# Patient Record
Sex: MALE | HISPANIC OR LATINO | Employment: UNEMPLOYED | URBAN - METROPOLITAN AREA
[De-identification: names, ages, dates, MRNs, and addresses within clinical notes are randomized per-mention and may not be internally consistent; named-entity substitution may affect disease eponyms.]

---

## 2020-02-28 ENCOUNTER — APPOINTMENT (OUTPATIENT)
Dept: RADIOLOGY | Facility: MEDICAL CENTER | Age: 25
End: 2020-02-28
Attending: EMERGENCY MEDICINE
Payer: COMMERCIAL

## 2020-03-29 ENCOUNTER — APPOINTMENT (OUTPATIENT)
Dept: RADIOLOGY | Facility: MEDICAL CENTER | Age: 25
DRG: 193 | End: 2020-03-29
Attending: EMERGENCY MEDICINE
Payer: COMMERCIAL

## 2020-03-29 ENCOUNTER — HOSPITAL ENCOUNTER (INPATIENT)
Facility: MEDICAL CENTER | Age: 25
LOS: 5 days | DRG: 193 | End: 2020-04-03
Attending: EMERGENCY MEDICINE | Admitting: HOSPITALIST
Payer: COMMERCIAL

## 2020-03-29 DIAGNOSIS — J12.9 VIRAL PNEUMONIA: ICD-10-CM

## 2020-03-29 DIAGNOSIS — J06.9 VIRAL URI WITH COUGH: ICD-10-CM

## 2020-03-29 PROBLEM — R09.02 HYPOXIA: Status: ACTIVE | Noted: 2020-03-29

## 2020-03-29 PROBLEM — R74.01 TRANSAMINITIS: Status: ACTIVE | Noted: 2020-03-29

## 2020-03-29 PROBLEM — B34.9 VIRAL SYNDROME: Status: ACTIVE | Noted: 2020-03-29

## 2020-03-29 PROBLEM — D72.819 LEUKOPENIA: Status: ACTIVE | Noted: 2020-03-29

## 2020-03-29 LAB
ALBUMIN SERPL BCP-MCNC: 3.5 G/DL (ref 3.2–4.9)
ALBUMIN/GLOB SERPL: 1 G/DL
ALP SERPL-CCNC: 66 U/L (ref 30–99)
ALT SERPL-CCNC: 153 U/L (ref 2–50)
ANION GAP SERPL CALC-SCNC: 10 MMOL/L (ref 7–16)
AST SERPL-CCNC: 143 U/L (ref 12–45)
BASOPHILS # BLD AUTO: 0.3 % (ref 0–1.8)
BASOPHILS # BLD: 0.01 K/UL (ref 0–0.12)
BILIRUB SERPL-MCNC: 0.9 MG/DL (ref 0.1–1.5)
BUN SERPL-MCNC: 9 MG/DL (ref 8–22)
CALCIUM SERPL-MCNC: 8.3 MG/DL (ref 8.5–10.5)
CHLORIDE SERPL-SCNC: 103 MMOL/L (ref 96–112)
CO2 SERPL-SCNC: 23 MMOL/L (ref 20–33)
CREAT SERPL-MCNC: 1.12 MG/DL (ref 0.5–1.4)
CRP SERPL HS-MCNC: 1 MG/DL (ref 0–0.75)
D DIMER PPP IA.FEU-MCNC: 0.58 UG/ML (FEU) (ref 0–0.5)
EKG IMPRESSION: NORMAL
EOSINOPHIL # BLD AUTO: 0.01 K/UL (ref 0–0.51)
EOSINOPHIL NFR BLD: 0.3 % (ref 0–6.9)
ERYTHROCYTE [DISTWIDTH] IN BLOOD BY AUTOMATED COUNT: 36.5 FL (ref 35.9–50)
FERRITIN SERPL-MCNC: 1209 NG/ML (ref 22–322)
FLUAV RNA SPEC QL NAA+PROBE: NEGATIVE
FLUBV RNA SPEC QL NAA+PROBE: NEGATIVE
GLOBULIN SER CALC-MCNC: 3.6 G/DL (ref 1.9–3.5)
GLUCOSE SERPL-MCNC: 123 MG/DL (ref 65–99)
HAV IGM SERPL QL IA: NORMAL
HBV CORE IGM SER QL: NORMAL
HBV SURFACE AG SER QL: NORMAL
HCT VFR BLD AUTO: 48.1 % (ref 42–52)
HCV AB SER QL: NORMAL
HGB BLD-MCNC: 16.4 G/DL (ref 14–18)
IMM GRANULOCYTES # BLD AUTO: 0.02 K/UL (ref 0–0.11)
IMM GRANULOCYTES NFR BLD AUTO: 0.5 % (ref 0–0.9)
LACTATE BLD-SCNC: 1.6 MMOL/L (ref 0.5–2)
LYMPHOCYTES # BLD AUTO: 0.8 K/UL (ref 1–4.8)
LYMPHOCYTES NFR BLD: 21.6 % (ref 22–41)
MCH RBC QN AUTO: 28.4 PG (ref 27–33)
MCHC RBC AUTO-ENTMCNC: 34.1 G/DL (ref 33.7–35.3)
MCV RBC AUTO: 83.4 FL (ref 81.4–97.8)
MONOCYTES # BLD AUTO: 0.26 K/UL (ref 0–0.85)
MONOCYTES NFR BLD AUTO: 7 % (ref 0–13.4)
NEUTROPHILS # BLD AUTO: 2.61 K/UL (ref 1.82–7.42)
NEUTROPHILS NFR BLD: 70.3 % (ref 44–72)
NRBC # BLD AUTO: 0 K/UL
NRBC BLD-RTO: 0 /100 WBC
PLATELET # BLD AUTO: 174 K/UL (ref 164–446)
PMV BLD AUTO: 9.4 FL (ref 9–12.9)
POTASSIUM SERPL-SCNC: 3.8 MMOL/L (ref 3.6–5.5)
PROCALCITONIN SERPL-MCNC: <0.05 NG/ML
PROT SERPL-MCNC: 7.1 G/DL (ref 6–8.2)
RBC # BLD AUTO: 5.77 M/UL (ref 4.7–6.1)
SODIUM SERPL-SCNC: 136 MMOL/L (ref 135–145)
WBC # BLD AUTO: 3.7 K/UL (ref 4.8–10.8)

## 2020-03-29 PROCEDURE — 86140 C-REACTIVE PROTEIN: CPT

## 2020-03-29 PROCEDURE — 700102 HCHG RX REV CODE 250 W/ 637 OVERRIDE(OP): Performed by: EMERGENCY MEDICINE

## 2020-03-29 PROCEDURE — 99285 EMERGENCY DEPT VISIT HI MDM: CPT

## 2020-03-29 PROCEDURE — 80074 ACUTE HEPATITIS PANEL: CPT

## 2020-03-29 PROCEDURE — 71045 X-RAY EXAM CHEST 1 VIEW: CPT

## 2020-03-29 PROCEDURE — 82728 ASSAY OF FERRITIN: CPT

## 2020-03-29 PROCEDURE — 87502 INFLUENZA DNA AMP PROBE: CPT

## 2020-03-29 PROCEDURE — A9270 NON-COVERED ITEM OR SERVICE: HCPCS | Performed by: EMERGENCY MEDICINE

## 2020-03-29 PROCEDURE — 93005 ELECTROCARDIOGRAM TRACING: CPT | Performed by: EMERGENCY MEDICINE

## 2020-03-29 PROCEDURE — 87040 BLOOD CULTURE FOR BACTERIA: CPT

## 2020-03-29 PROCEDURE — 770021 HCHG ROOM/CARE - ISO PRIVATE

## 2020-03-29 PROCEDURE — 36415 COLL VENOUS BLD VENIPUNCTURE: CPT

## 2020-03-29 PROCEDURE — 85025 COMPLETE CBC W/AUTO DIFF WBC: CPT

## 2020-03-29 PROCEDURE — 306637 HCHG MISC ORTHO ITEM RC 0274

## 2020-03-29 PROCEDURE — 85379 FIBRIN DEGRADATION QUANT: CPT

## 2020-03-29 PROCEDURE — 84145 PROCALCITONIN (PCT): CPT

## 2020-03-29 PROCEDURE — 700105 HCHG RX REV CODE 258: Performed by: EMERGENCY MEDICINE

## 2020-03-29 PROCEDURE — 83605 ASSAY OF LACTIC ACID: CPT

## 2020-03-29 PROCEDURE — 99223 1ST HOSP IP/OBS HIGH 75: CPT | Performed by: HOSPITALIST

## 2020-03-29 PROCEDURE — 80053 COMPREHEN METABOLIC PANEL: CPT

## 2020-03-29 RX ORDER — GUAIFENESIN/DEXTROMETHORPHAN 100-10MG/5
10 SYRUP ORAL EVERY 6 HOURS PRN
Status: DISCONTINUED | OUTPATIENT
Start: 2020-03-29 | End: 2020-04-03 | Stop reason: HOSPADM

## 2020-03-29 RX ORDER — ACETAMINOPHEN 325 MG/1
650 TABLET ORAL EVERY 6 HOURS PRN
Status: DISCONTINUED | OUTPATIENT
Start: 2020-03-29 | End: 2020-04-03 | Stop reason: HOSPADM

## 2020-03-29 RX ORDER — SODIUM CHLORIDE 9 MG/ML
2000 INJECTION, SOLUTION INTRAVENOUS ONCE
Status: COMPLETED | OUTPATIENT
Start: 2020-03-29 | End: 2020-03-29

## 2020-03-29 RX ORDER — MORPHINE SULFATE 4 MG/ML
4 INJECTION, SOLUTION INTRAMUSCULAR; INTRAVENOUS ONCE
Status: ACTIVE | OUTPATIENT
Start: 2020-03-29 | End: 2020-03-30

## 2020-03-29 RX ORDER — ACETAMINOPHEN 500 MG
1000 TABLET ORAL ONCE
Status: COMPLETED | OUTPATIENT
Start: 2020-03-29 | End: 2020-03-29

## 2020-03-29 RX ADMIN — ACETAMINOPHEN 1000 MG: 500 TABLET ORAL at 20:39

## 2020-03-29 RX ADMIN — SODIUM CHLORIDE 2000 ML: 9 INJECTION, SOLUTION INTRAVENOUS at 20:39

## 2020-03-29 ASSESSMENT — LIFESTYLE VARIABLES
EVER_SMOKED: NEVER
TOTAL SCORE: 0
TOTAL SCORE: 0
DO YOU DRINK ALCOHOL: NO
EVER FELT BAD OR GUILTY ABOUT YOUR DRINKING: NO
CONSUMPTION TOTAL: NEGATIVE
EVER_SMOKED: NEVER
AVERAGE NUMBER OF DAYS PER WEEK YOU HAVE A DRINK CONTAINING ALCOHOL: 1
EVER HAD A DRINK FIRST THING IN THE MORNING TO STEADY YOUR NERVES TO GET RID OF A HANGOVER: NO
HAVE PEOPLE ANNOYED YOU BY CRITICIZING YOUR DRINKING: NO
HOW MANY TIMES IN THE PAST YEAR HAVE YOU HAD 5 OR MORE DRINKS IN A DAY: 0
DOES PATIENT WANT TO STOP DRINKING: NO
ON A TYPICAL DAY WHEN YOU DRINK ALCOHOL HOW MANY DRINKS DO YOU HAVE: 1
HAVE YOU EVER FELT YOU SHOULD CUT DOWN ON YOUR DRINKING: NO
ALCOHOL_USE: YES
TOTAL SCORE: 0

## 2020-03-29 ASSESSMENT — COPD QUESTIONNAIRES
DURING THE PAST 4 WEEKS HOW MUCH DID YOU FEEL SHORT OF BREATH: NONE/LITTLE OF THE TIME
HAVE YOU SMOKED AT LEAST 100 CIGARETTES IN YOUR ENTIRE LIFE: NO/DON'T KNOW
DO YOU EVER COUGH UP ANY MUCUS OR PHLEGM?: YES, A FEW DAYS A WEEK OR MONTH
COPD SCREENING SCORE: 1

## 2020-03-29 ASSESSMENT — PATIENT HEALTH QUESTIONNAIRE - PHQ9
2. FEELING DOWN, DEPRESSED, IRRITABLE, OR HOPELESS: NOT AT ALL
SUM OF ALL RESPONSES TO PHQ9 QUESTIONS 1 AND 2: 0
1. LITTLE INTEREST OR PLEASURE IN DOING THINGS: NOT AT ALL

## 2020-03-29 ASSESSMENT — ENCOUNTER SYMPTOMS
MYALGIAS: 0
TINGLING: 0
FOCAL WEAKNESS: 0
DIZZINESS: 0
ABDOMINAL PAIN: 0
VOMITING: 0
DEPRESSION: 0
FEVER: 1
HEADACHES: 0
NAUSEA: 0
SORE THROAT: 0
DIARRHEA: 0
PALPITATIONS: 0
PHOTOPHOBIA: 0
CHILLS: 1
SPUTUM PRODUCTION: 1
WHEEZING: 0
SHORTNESS OF BREATH: 0
COUGH: 1

## 2020-03-30 ENCOUNTER — APPOINTMENT (OUTPATIENT)
Dept: RADIOLOGY | Facility: MEDICAL CENTER | Age: 25
DRG: 193 | End: 2020-03-30
Attending: NURSE PRACTITIONER
Payer: COMMERCIAL

## 2020-03-30 LAB
ALBUMIN SERPL BCP-MCNC: 3.3 G/DL (ref 3.2–4.9)
ALBUMIN/GLOB SERPL: 1 G/DL
ALP SERPL-CCNC: 58 U/L (ref 30–99)
ALT SERPL-CCNC: 128 U/L (ref 2–50)
ANION GAP SERPL CALC-SCNC: 10 MMOL/L (ref 7–16)
ANION GAP SERPL CALC-SCNC: 11 MMOL/L (ref 7–16)
AST SERPL-CCNC: 114 U/L (ref 12–45)
BASOPHILS # BLD AUTO: 0.2 % (ref 0–1.8)
BASOPHILS # BLD: 0.01 K/UL (ref 0–0.12)
BILIRUB CONJ SERPL-MCNC: 0.3 MG/DL (ref 0.1–0.5)
BILIRUB INDIRECT SERPL-MCNC: 0.6 MG/DL (ref 0–1)
BILIRUB SERPL-MCNC: 0.9 MG/DL (ref 0.1–1.5)
BUN SERPL-MCNC: 8 MG/DL (ref 8–22)
BUN SERPL-MCNC: 9 MG/DL (ref 8–22)
CALCIUM SERPL-MCNC: 7.5 MG/DL (ref 8.5–10.5)
CALCIUM SERPL-MCNC: 7.7 MG/DL (ref 8.5–10.5)
CHLORIDE SERPL-SCNC: 103 MMOL/L (ref 96–112)
CHLORIDE SERPL-SCNC: 107 MMOL/L (ref 96–112)
CO2 SERPL-SCNC: 22 MMOL/L (ref 20–33)
CO2 SERPL-SCNC: 22 MMOL/L (ref 20–33)
CREAT SERPL-MCNC: 0.79 MG/DL (ref 0.5–1.4)
CREAT SERPL-MCNC: 0.86 MG/DL (ref 0.5–1.4)
D DIMER PPP IA.FEU-MCNC: 0.8 UG/ML (FEU) (ref 0–0.5)
EOSINOPHIL # BLD AUTO: 0.01 K/UL (ref 0–0.51)
EOSINOPHIL NFR BLD: 0.2 % (ref 0–6.9)
ERYTHROCYTE [DISTWIDTH] IN BLOOD BY AUTOMATED COUNT: 37.2 FL (ref 35.9–50)
ERYTHROCYTE [DISTWIDTH] IN BLOOD BY AUTOMATED COUNT: 38.1 FL (ref 35.9–50)
FERRITIN SERPL-MCNC: 1101 NG/ML (ref 22–322)
GLOBULIN SER CALC-MCNC: 3.3 G/DL (ref 1.9–3.5)
GLUCOSE SERPL-MCNC: 90 MG/DL (ref 65–99)
GLUCOSE SERPL-MCNC: 95 MG/DL (ref 65–99)
HCT VFR BLD AUTO: 46.1 % (ref 42–52)
HCT VFR BLD AUTO: 46.3 % (ref 42–52)
HGB BLD-MCNC: 15.4 G/DL (ref 14–18)
HGB BLD-MCNC: 15.4 G/DL (ref 14–18)
IMM GRANULOCYTES # BLD AUTO: 0.03 K/UL (ref 0–0.11)
IMM GRANULOCYTES NFR BLD AUTO: 0.7 % (ref 0–0.9)
LDH SERPL L TO P-CCNC: 355 U/L (ref 107–266)
LYMPHOCYTES # BLD AUTO: 1.17 K/UL (ref 1–4.8)
LYMPHOCYTES NFR BLD: 26.7 % (ref 22–41)
MCH RBC QN AUTO: 28.3 PG (ref 27–33)
MCH RBC QN AUTO: 28.4 PG (ref 27–33)
MCHC RBC AUTO-ENTMCNC: 33.3 G/DL (ref 33.7–35.3)
MCHC RBC AUTO-ENTMCNC: 33.4 G/DL (ref 33.7–35.3)
MCV RBC AUTO: 84.7 FL (ref 81.4–97.8)
MCV RBC AUTO: 85.3 FL (ref 81.4–97.8)
MONOCYTES # BLD AUTO: 0.24 K/UL (ref 0–0.85)
MONOCYTES NFR BLD AUTO: 5.5 % (ref 0–13.4)
NEUTROPHILS # BLD AUTO: 2.93 K/UL (ref 1.82–7.42)
NEUTROPHILS NFR BLD: 66.7 % (ref 44–72)
NRBC # BLD AUTO: 0 K/UL
NRBC BLD-RTO: 0 /100 WBC
PLATELET # BLD AUTO: 162 K/UL (ref 164–446)
PLATELET # BLD AUTO: 179 K/UL (ref 164–446)
PMV BLD AUTO: 9.3 FL (ref 9–12.9)
PMV BLD AUTO: 9.7 FL (ref 9–12.9)
POTASSIUM SERPL-SCNC: 4 MMOL/L (ref 3.6–5.5)
POTASSIUM SERPL-SCNC: 4.3 MMOL/L (ref 3.6–5.5)
PROT SERPL-MCNC: 6.6 G/DL (ref 6–8.2)
RBC # BLD AUTO: 5.43 M/UL (ref 4.7–6.1)
RBC # BLD AUTO: 5.44 M/UL (ref 4.7–6.1)
SODIUM SERPL-SCNC: 136 MMOL/L (ref 135–145)
SODIUM SERPL-SCNC: 139 MMOL/L (ref 135–145)
WBC # BLD AUTO: 4 K/UL (ref 4.8–10.8)
WBC # BLD AUTO: 4.4 K/UL (ref 4.8–10.8)

## 2020-03-30 PROCEDURE — 85025 COMPLETE CBC W/AUTO DIFF WBC: CPT

## 2020-03-30 PROCEDURE — 80048 BASIC METABOLIC PNL TOTAL CA: CPT

## 2020-03-30 PROCEDURE — 700102 HCHG RX REV CODE 250 W/ 637 OVERRIDE(OP): Performed by: HOSPITALIST

## 2020-03-30 PROCEDURE — 700117 HCHG RX CONTRAST REV CODE 255: Performed by: NURSE PRACTITIONER

## 2020-03-30 PROCEDURE — 306637 HCHG MISC ORTHO ITEM RC 0274

## 2020-03-30 PROCEDURE — 85379 FIBRIN DEGRADATION QUANT: CPT

## 2020-03-30 PROCEDURE — 82728 ASSAY OF FERRITIN: CPT

## 2020-03-30 PROCEDURE — 85027 COMPLETE CBC AUTOMATED: CPT

## 2020-03-30 PROCEDURE — 770021 HCHG ROOM/CARE - ISO PRIVATE

## 2020-03-30 PROCEDURE — 80053 COMPREHEN METABOLIC PANEL: CPT

## 2020-03-30 PROCEDURE — A9270 NON-COVERED ITEM OR SERVICE: HCPCS | Performed by: HOSPITALIST

## 2020-03-30 PROCEDURE — 99232 SBSQ HOSP IP/OBS MODERATE 35: CPT | Performed by: HOSPITALIST

## 2020-03-30 PROCEDURE — 82248 BILIRUBIN DIRECT: CPT

## 2020-03-30 PROCEDURE — 83615 LACTATE (LD) (LDH) ENZYME: CPT

## 2020-03-30 PROCEDURE — 71275 CT ANGIOGRAPHY CHEST: CPT

## 2020-03-30 RX ADMIN — IOHEXOL 60 ML: 350 INJECTION, SOLUTION INTRAVENOUS at 13:43

## 2020-03-30 RX ADMIN — ACETAMINOPHEN 650 MG: 325 TABLET, FILM COATED ORAL at 13:21

## 2020-03-30 RX ADMIN — ACETAMINOPHEN 650 MG: 325 TABLET, FILM COATED ORAL at 20:22

## 2020-03-30 ASSESSMENT — ENCOUNTER SYMPTOMS
BLURRED VISION: 0
DIZZINESS: 0
HEMOPTYSIS: 0
COUGH: 1
ORTHOPNEA: 0
DOUBLE VISION: 0
BRUISES/BLEEDS EASILY: 0
SHORTNESS OF BREATH: 1
SENSORY CHANGE: 0
STRIDOR: 0
SEIZURES: 0
FEVER: 1
VOMITING: 0
ABDOMINAL PAIN: 0
NAUSEA: 1
BLOOD IN STOOL: 0
DIAPHORESIS: 0
WEIGHT LOSS: 0
PHOTOPHOBIA: 0
DIARRHEA: 1
SORE THROAT: 0
WHEEZING: 0
HEARTBURN: 0
PND: 0
CHILLS: 0
MYALGIAS: 0
PALPITATIONS: 0
FOCAL WEAKNESS: 0
HEADACHES: 0

## 2020-03-30 ASSESSMENT — COPD QUESTIONNAIRES
COPD SCREENING SCORE: 4
DURING THE PAST 4 WEEKS HOW MUCH DID YOU FEEL SHORT OF BREATH: SOME OF THE TIME
HAVE YOU SMOKED AT LEAST 100 CIGARETTES IN YOUR ENTIRE LIFE: YES
DO YOU EVER COUGH UP ANY MUCUS OR PHLEGM?: YES, A FEW DAYS A WEEK OR MONTH

## 2020-03-30 NOTE — DISCHARGE PLANNING
Medical Social Work    Pt's parents came in to the ER Lobby requesting to see pt.  MSW spoke with bedside RN who states that she was trying to help pt contact his family last night with no luck.  RN states that pt is currently sleeping.   MSW met with pt's mom and dad, Rebel Nash ([phone]) and advised them of the above.  Pt's family was educated about our current visitor policy and were advised that pt is sleeping and he will be advised of their visit.  Pt's family was also given CDU nurses station number to call later this morning to check on pt.  Pt's parents left hospital at this time.

## 2020-03-30 NOTE — PROGRESS NOTES
New patient from the ED. Patient is stable and Ax4 and on 2LNC. Patient is ambulatory. Bed in lowest position. Bedside table within reach. Call bell at bedside.

## 2020-03-30 NOTE — ED NOTES
PT resting with IVF running as ordered.  PT with VSS but on RA pt drops to 88%.  PT on 2l NC and speaking in full sentences.

## 2020-03-30 NOTE — PROGRESS NOTES
[facility] Daily Progress Note    Date of Service  3/30/2020    Chief Complaint  24 y.o. male admitted 3/29/2020 with fever, cough, and shortness of breath    Hospital Course    Patient is a 24-year-old male who presented with 2 to 3 days of fever, cough, shortness of breath and diarrhea.  Influenza swabs in the ER were negative.  Chest x-ray shows poor inspiratory effort/obesity with bibasilar atelectasis versus infiltrate      Interval Problem Update  T-max per 24 is 102.7  P 77, RR 17, 94% on RA, although patient has desatted to the low 80s with ambulation and at rest  WBCs low  Lymphocytes normal  Transaminitis is slightly improved  Elevated LDH  D-dimer is increasing slightly to 0.8  Ferritin elevated  CTA chest shows multifocal PNA    Consultants/Specialty  None    Code Status  Full    Disposition  TBD    Review of Systems  Review of Systems   Constitutional: Positive for fever and malaise/fatigue. Negative for chills, diaphoresis and weight loss.   HENT: Negative for ear pain, sore throat and tinnitus.    Eyes: Negative for blurred vision, double vision and photophobia.   Respiratory: Positive for cough and shortness of breath. Negative for hemoptysis, wheezing and stridor.    Cardiovascular: Negative for chest pain, palpitations, orthopnea and PND.   Gastrointestinal: Positive for diarrhea and nausea. Negative for abdominal pain, blood in stool, heartburn, melena and vomiting.   Genitourinary: Negative for dysuria, hematuria and urgency.   Musculoskeletal: Negative for joint pain and myalgias.   Neurological: Negative for dizziness, sensory change, focal weakness, seizures and headaches.   Endo/Heme/Allergies: Does not bruise/bleed easily.        Physical Exam  Temp:  [37 °C (98.6 °F)-39.3 °C (102.7 °F)] 37.2 °C (98.9 °F)  Pulse:  [] 77  Resp:  [16-20] 17  BP: ()/(55-89) 102/63  SpO2:  [94 %-96 %] 94 %    Physical Exam  Vitals signs and nursing note reviewed.   Constitutional:        Appearance: He is obese. He is ill-appearing.      Interventions: Nasal cannula in place.   HENT:      Head: Normocephalic and atraumatic.      Nose: Nose normal.   Eyes:      Conjunctiva/sclera: Conjunctivae normal.      Pupils: Pupils are equal, round, and reactive to light.   Neck:      Musculoskeletal: Neck supple.   Cardiovascular:      Rate and Rhythm: Normal rate and regular rhythm.      Heart sounds: No murmur. No friction rub.   Pulmonary:      Effort: No respiratory distress.   Abdominal:      General: Bowel sounds are normal. There is no distension.      Palpations: Abdomen is soft.   Skin:     General: Skin is warm and dry.   Neurological:      Mental Status: He is alert.         Fluids  No intake or output data in the 24 hours ending 03/30/20 1114    Laboratory  Recent Labs     03/29/20 1955 03/30/20 0223 03/30/20  0938   WBC 3.7* 4.0* 4.4*   RBC 5.77 5.44 5.43   HEMOGLOBIN 16.4 15.4 15.4   HEMATOCRIT 48.1 46.1 46.3   MCV 83.4 84.7 85.3   MCH 28.4 28.3 28.4   MCHC 34.1 33.4* 33.3*   RDW 36.5 37.2 38.1   PLATELETCT 174 162* 179   MPV 9.4 9.3 9.7     Recent Labs     03/29/20 1955 03/30/20 0223   SODIUM 136 139   POTASSIUM 3.8 4.3   CHLORIDE 103 107   CO2 23 22   GLUCOSE 123* 90   BUN 9 9   CREATININE 1.12 0.86   CALCIUM 8.3* 7.5*                   Imaging  DX-CHEST-PORTABLE (1 VIEW)   Final Result      Poor inspiration with hypoventilatory change with bibasilar atelectasis versus infiltrate.      CT-CTA CHEST PULMONARY ARTERY W/ RECONS    (Results Pending)        Assessment/Plan  * Viral syndrome- (present on admission)  Assessment & Plan  COVID-19 pending  Surrogate markers suggestive of infection  D-dimer is elevated and climbing  CT CTA pulmonary artery shows multifocal PNA  Supplemental O2  Tylenol and morphine for pain --follow transaminitis and avoid acetaminophen if needed  Avoid IV fluids  Avoid NSAIDs    Hypoxia- (present on admission)  Assessment & Plan  Treatment as noted  above.    Transaminitis- (present on admission)  Assessment & Plan  Improving  Daily CMP    Leukopenia- (present on admission)  Assessment & Plan  Likely secondary to underlying viral infection, continue to monitor.       VTE prophylaxis: Enoxaparin

## 2020-03-30 NOTE — PROGRESS NOTES
Assessment completed.  Pt A&Ox4, afebrile.  Respirations even, unlabored on room air, diminished lung sound to RLL and LLL.   Dry cough noted, pt denies SOB. Pt denies any pain at this time.   POC discussed; communication board updated.    Bed in locked, lowest position.  Call light and belongings within reach.  Needs met, will continue to monitor.

## 2020-03-30 NOTE — DISCHARGE PLANNING
Care Transition Team Assessment    Chart reviewed. 24 yr old male with father as support. No PCP. Anticipate no needs @ present time. CM will follow as needed.    Information Source  Information Given By: Other (Comments)(Chart reviewed.)    Readmission Evaluation  Is this a readmission?: No  Interdisciplinary Discharge Planning  Primary Care Physician: None  Patient or legal guardian wants to designate a caregiver (see row info): No  Support Systems: Parent  Do You Take your Prescribed Medications Regularly: No  Reasons Why Not Taking Medications : (No PCP)  Able to Return to Previous ADL's: Yes  Mobility Issues: No  Prior Services: None  Patient Expects to be Discharged to:: Home  Assistance Needed: No  Durable Medical Equipment: Not Applicable    Discharge Preparedness  What are your discharge supports?: Parent  Prior Functional Level: Ambulatory    Functional Assesment  Prior Functional Level: Ambulatory    Finances  Prescription Coverage: Yes    Discharge Risks or Barriers  Discharge risks or barriers?: No PCP  Patient risk factors: No PCP    Anticipated Discharge Information  Anticipated discharge disposition: Home  Discharge Address: [address] [place] [address]  Discharge Contact Phone Number: [phone]

## 2020-03-30 NOTE — CARE PLAN
Problem: Monitor Oxygen Saturation  Goal: Levels of oxygenation will improve  Outcome: PROGRESSING AS EXPECTED

## 2020-03-30 NOTE — H&P
Hospital Medicine History & Physical Note    Date of Service  3/29/2020    Primary Care Physician  No primary care provider on file.    Consultants  None    Code Status  Full    Chief Complaint  Chief Complaint   Patient presents with   • Shortness of Breath     cough x 2 days   • N/V     started today   • Diarrhea     x 1 today       History of Presenting Illness  24 y.o. male who presented on 3/29/2020 with shortness of breath and cough.  This is a 24-year-old gentleman with no reported medical issues who comes in with approximately 1 week of cough productive of white sputum associated with subjective fevers, chills, and a gradually decreasing sensation of taste and smell.  Patient denies any headaches, myalgias, but in the last day he did develop episodes of vomiting with nausea and diarrhea.  He has had no other sick contacts that he is aware of and no one else is ill in his home.  He has had no recent travel.    Review of Systems  Review of Systems   Constitutional: Positive for chills and fever.   HENT: Negative for congestion and sore throat.    Eyes: Negative for photophobia.   Respiratory: Positive for cough and sputum production. Negative for shortness of breath and wheezing.    Cardiovascular: Negative for chest pain and palpitations.   Gastrointestinal: Negative for abdominal pain, diarrhea, nausea and vomiting.   Genitourinary: Negative for dysuria.   Musculoskeletal: Negative for myalgias.   Skin: Negative.    Neurological: Negative for dizziness, tingling, focal weakness and headaches.        Change in taste and smell   Psychiatric/Behavioral: Negative for depression and suicidal ideas.       Past Medical History  Patient denies    Surgical History  Patient denies    Family History  Paternal grandmother with diabetes    Social History  Quit smoking 1 week ago, occasional alcohol, no illicit drugs    Allergies  No Known Allergies    Medications  No current facility-administered medications on file  prior to encounter.      No current outpatient medications on file prior to encounter.       Physical Exam  Hemodynamics  Temp (24hrs), Av.3 °C (102.7 °F), Min:39.3 °C (102.7 °F), Max:39.3 °C (102.7 °F)   Temperature: (!) 39.3 °C (102.7 °F)  Pulse  Av.5  Min: 97  Max: 112    Blood Pressure: 104/59      Respiratory      Respiration: 20, Pulse Oximetry: 94 %             Physical Exam   Constitutional: He is oriented to person, place, and time. No distress.   Obese   HENT:   Head: Normocephalic and atraumatic.   Right Ear: External ear normal.   Left Ear: External ear normal.   Eyes: EOM are normal. Right eye exhibits no discharge. Left eye exhibits no discharge.   Neck: Neck supple. No JVD present.   Cardiovascular: Normal rate, regular rhythm and normal heart sounds.   Pulmonary/Chest: Effort normal and breath sounds normal. No respiratory distress. He exhibits no tenderness.   Abdominal: Soft. Bowel sounds are normal. He exhibits no distension. There is no abdominal tenderness.   Musculoskeletal:         General: No edema.   Neurological: He is alert and oriented to person, place, and time. No cranial nerve deficit.   Skin: Skin is dry. He is not diaphoretic. No erythema.   Psychiatric: He has a normal mood and affect. His behavior is normal.   Nursing note and vitals reviewed.    Capillary refill less than 3 seconds, distal pulses intact    Laboratory:  Recent Labs     20   WBC 3.7*   RBC 5.77   HEMOGLOBIN 16.4   HEMATOCRIT 48.1   MCV 83.4   MCH 28.4   MCHC 34.1   RDW 36.5   PLATELETCT 174   MPV 9.4     Recent Labs     20   SODIUM 136   POTASSIUM 3.8   CHLORIDE 103   CO2 23   GLUCOSE 123*   BUN 9   CREATININE 1.12   CALCIUM 8.3*     Recent Labs     20   ALTSGPT 153*   ASTSGOT 143*   ALKPHOSPHAT 66   TBILIRUBIN 0.9   GLUCOSE 123*                 No results found for: TROPONINI    Imaging  Dx-chest-portable (1 View)    Result Date: 3/29/2020  3/29/2020 7:48 PM  HISTORY/REASON FOR EXAM:  possible sepsis.. Cough. Shortness of breath. TECHNIQUE/EXAM DESCRIPTION AND NUMBER OF VIEWS: Single portable view of the chest. COMPARISON: None FINDINGS: Heart size is within normal limits.  Film was taken after poor inspiration and there is hypoventilatory change. There is bibasilar atelectasis versus infiltrate. No pleural abnormalities are noted.     Poor inspiration with hypoventilatory change with bibasilar atelectasis versus infiltrate.        Assessment/Plan:  Anticipate that patient will need greater than 2 midnights for management of the discussed medical issues.    * Viral syndrome  Assessment & Plan  Patient with suspected viral upper respiratory infection with hypoxia and fever requiring admission.  Because of this, he will be ruled out for coronavirus.  He will be admitted to the hospital and provided with supportive care and symptomatic management for his cough, placed on respiratory therapy protocol and supplemental O2 which we will wean as tolerated.  Tylenol for fevers, check procalcitonin and ferritin level.  Airborne droplet precautions remain in place until ruled out.    Hypoxia  Assessment & Plan  Treatment as noted above.    Transaminitis  Assessment & Plan  Likely secondary to underlying viral infection, continue to monitor, check hepatitis panel.  If any worsening, will need to stop hepatotoxic medications including Tylenol.    Leukopenia  Assessment & Plan  Likely secondary to underlying viral infection, continue to monitor.      Prophylaxis: Sequential compression devices for DVT prophylaxis, no PPI indicated, bowel protocol as needed

## 2020-03-30 NOTE — PROGRESS NOTES
2 RN Skin Check    2 RN skin check complete [place]  Devices in place: Nasal Cannula.  Skin assessed under devices: YES.  Confirmed pressure ulcers found on: No pressure ulcers found.  New potential pressure ulcers noted on: Not at risk for pressure ulcers.    Patient skin was assessed from head to toe and all skin was intact. Patient has healed abscess on scalp.

## 2020-03-30 NOTE — ED PROVIDER NOTES
ED Provider Note    Scribed for Prabhjot Mcnulty M.D. by Anamika Jackson. 3/29/2020, 7:28 PM.    Primary care provider: PCP, Pt. States none.  Means of arrival: EMS  History obtained from: Patient  History limited by: None    CHIEF COMPLAINT  Chief Complaint   Patient presents with   • Shortness of Breath     cough x 2 days   • N/V     started today   • Diarrhea     x 1 today       HPI  Patient is a 24 y.o. male who presents to the Emergency Department via EMS for shortness of breath and nonproductive cough that began 2 days ago. Patient notes that his shortness of breath and cough are constant and worsening since this morning.  He has taken ibuprofen with mild relief of symptoms. He reports associated non bloody diarrhea x1 week, nausea, non bloody emesis x1 episode this morning, and mild chest pain with his cough. He denies any associated fever, abdominal pain, unilateral leg swelling, exogenous hormone use, history of DVT/PE, or hemoptysis. He denies receiving the influenza vaccine. He also denies any recent travel, sick contacts, or known COVID-19 exposure. He admits to occasional alcohol use, but denies smoking cigarettes, or recreational drug use.     REVIEW OF SYSTEMS  Pertinent positives include shortness of breath, nonproductive cough, diarrhea, nausea, vomiting, chest pain. Pertinent negatives include no fever, abdominal pain, or unilateral leg swelling. As above, all other systems reviewed and are negative.   See HPI for further details.     PAST MEDICAL HISTORY   Denies    SURGICAL HISTORY  patient denies any surgical history    SOCIAL HISTORY  Social History     Tobacco Use   • Smoking status: None   Substance Use Topics   • Alcohol use: Occasional   • Drug use: None        FAMILY HISTORY  None    CURRENT MEDICATIONS  Home Medications     Reviewed by Wyatt Johnson R.N. (Registered Nurse) on 03/29/20 at 1924  Med List Status: Not Addressed   Medication Last Dose Status        Patient [place] Taking any  "Medications                       ALLERGIES  No Known Allergies    PHYSICAL EXAM  VITAL SIGNS: /89   Pulse (!) 112   Temp 37.7 °C (99.8 °F) (Oral)   Resp 20   Ht 1.753 m (5' 9\")   Wt (!) 135 kg (297 lb 9.9 oz)   SpO2 94%   BMI 43.95 kg/m²   Vitals reviewed.    Constitutional: Alert in no apparent distress.  HENT: No signs of trauma, Bilateral external ears normal, Nose normal.  Dry mucous membranes.  Eyes: Pupils are equal and reactive, Conjunctiva normal, Non-icteric.   Neck: Normal range of motion, No tenderness, Supple, No stridor.   Lymphatic: No lymphadenopathy noted.   Cardiovascular: Tachycardic, Regular rhythm, no murmurs.   Thorax & Lungs: Decreased breath sounds globally, Normal breath sounds, No respiratory distress, No wheezing, No chest tenderness.   Abdomen: Bowel sounds normal, Soft, No tenderness, No peritoneal signs, No masses, No pulsatile masses.   Skin: Warm, Dry, No erythema, No rash.   Back: Normal alignment. No CVAT.   Extremities: Intact distal pulses, No edema, No tenderness, No cyanosis  Musculoskeletal: Good range of motion in all major joints. No major deformities noted.   Neurologic: Alert, Normal motor function, Normal sensory function, No focal deficits noted.   Psychiatric: Affect normal, Judgment normal, Mood normal.     DIAGNOSTIC STUDIES / PROCEDURES    LABS  Labs Reviewed   CBC WITH DIFFERENTIAL - Abnormal; Notable for the following components:       Result Value    WBC 3.7 (*)     Lymphocytes 21.60 (*)     Lymphs (Absolute) 0.80 (*)     All other components within normal limits   COMP METABOLIC PANEL - Abnormal; Notable for the following components:    Glucose 123 (*)     Calcium 8.3 (*)     AST(SGOT) 143 (*)     ALT(SGPT) 153 (*)     Globulin 3.6 (*)     All other components within normal limits   CRP QUANTITIVE (NON-CARDIAC) - Abnormal; Notable for the following components:    Stat C-Reactive Protein 1.00 (*)     All other components within normal limits   D-DIMER " "- Abnormal; Notable for the following components:    D-Dimer Screen 0.58 (*)     All other components within normal limits   LACTIC ACID   BLOOD CULTURE    Narrative:     Per Hospital Policy: Only change Specimen Src: to \"Line\" if  specified by physician order.   BLOOD CULTURE    Narrative:     Per Hospital Policy: Only change Specimen Src: to \"Line\" if  specified by physician order.   INFLUENZA A/B BY PCR   ESTIMATED GFR   LACTIC ACID   LACTIC ACID   URINALYSIS   URINE CULTURE(NEW)   HEPATITIS PANEL ACUTE(4 COMPONENTS)   PROCALCITONIN      All labs reviewed by me.    EKG Interpretation:  Results for orders placed or performed during the hospital encounter of 20   EKG   Result Value Ref Range    Report       [facility] Emergency Dept.    Test Date:  2020  Pt Name:    AFUA ENFF                Department: ER  MRN:        [id number]                      Room:       [facility]  Gender:     Male                         Technician: [id number]  :        [id number]                   Requested By:SUSAN POPE  Order #:    [id number]                    Reading MD: Susan Pope MD    Measurements  Intervals                                Axis  Rate:       97                           P:          38  OK:         148                          QRS:        -32  QRSD:       72                           T:          61  QT:         316  QTc:        402    Interpretive Statements  SINUS RHYTHM  PROBABLE LEFT ATRIAL ABNORMALITY  LEFT AXIS DEVIATION  BORDERLINE T ABNORMALITIES, ANT-LAT LEADS  ARTIFACT IN LEAD(S) II,III,aVR,aVL,aVF,V1,V2,V3,V4,V5,V6  No previous ECG available for comparison  Electronically Signed On 3- 22:10:56 PDT by Susan Pope MD       RADIOLOGY  DX-CHEST-PORTABLE (1 VIEW)   Final Result      Poor inspiration with hypoventilatory change with bibasilar atelectasis versus infiltrate.        The radiologist's interpretation of all radiological studies have been reviewed by me.    COURSE & " MEDICAL DECISION MAKING  Nursing notes, VS, PMSFHx reviewed in chart.  Differential diagnoses include but not limited to: influenza, viral illness, pneumonia, pulmonary embolism, COVID-19     7:28 PM Patient seen and examined at bedside. Patient arrives febrile and tachycardic and meets criteria for sepsis.  Patient appears dehydrated and ill but not toxic. Ordered for EKG, D-dimer, CRP, CMP, CBC, Blood culture, urine culture, lactic acid, influenza, and DX chest to evaluate. Patient will be treated with Tylenol 1000 mg and NS infusion 2000 ml for his symptoms.    Patient's white blood cell count is 3.7 and there is no left shift although she is lymphopenic.  His metabolic panel reveals mildly elevated blood glucose to 123 with a transaminitis.  CRP is elevated to 1.00 and d-dimer is slightly elevated to 0.58 but without hemoptysis or other risk factors for PE I think this is most likely to represent inflammation either from pneumonia or likely COVID-19 infection.  Influenza is negative.  Chest x-ray reveals bibasilar atelectasis versus infiltrate which was difficult to distinguish between due to poor inspiratory effort from the patient.    9:11 PM - Patient will be treated with Morphine 4 mg injection for the chest discomfort with coughing.     9:34 PM - Paged hospitalist.    9:37 PM - Patient was reevaluated at bedside. Patient is resting in bed, with persistent shortness of breath, and tachycardic.  He continues to require 2 L supplemental oxygen.  Discussed lab and radiology results with the patient and we discussed the possibility of him having a pneumonia versus viral URI and/or potential coronavirus.  Patient will be admitted for further evaluation. He is understanding and agreeable with hospitalization.     9:42 PM - I discussed the patient's case and the above findings with Dr. Goodrich (hospitalist) who agreed to evaluate the patient for hospitalization.    HYDRATION: Based on the patient's presentation of  Sepsis and Tachycardia the patient was given IV fluids. IV Hydration was used because oral hydration was not adequate alone. Upon recheck following hydration, the patient was improved.     PPE Note: I personally donned full PPE for all patient encounters during this visit, including an N95 respirator mask, gloves, gown, and goggles.     Scribe remained outside the patient's room and did not have any contact with the patient for the duration of patient encounter.       DISPOSITION:  Patient will be hospitalized by Dr. Goodrich in guarded condition.      FINAL IMPRESSION  1. Viral URI with cough          Anamika SOTO (Scribe), am scribing for, and in the presence of, Prabhjot Mcnulty M.D..    Electronically signed by: Anamika Jackson (Scribe), 3/29/2020    IPrabhjot M.D. personally performed the services described in this documentation, as scribed by Anamika Jackson in my presence, and it is both accurate and complete.    C    The note accurately reflects work and decisions made by me.  Prabhjot Mcnulty M.D.  3/29/2020  10:16 PM

## 2020-03-31 ENCOUNTER — APPOINTMENT (OUTPATIENT)
Dept: RADIOLOGY | Facility: MEDICAL CENTER | Age: 25
DRG: 193 | End: 2020-03-31
Attending: NURSE PRACTITIONER
Payer: COMMERCIAL

## 2020-03-31 PROBLEM — R79.89 ELEVATED LFTS: Status: ACTIVE | Noted: 2020-03-29

## 2020-03-31 PROBLEM — J12.9 VIRAL PNEUMONIA: Status: ACTIVE | Noted: 2020-03-29

## 2020-03-31 PROBLEM — J96.01 ACUTE RESPIRATORY FAILURE WITH HYPOXIA (HCC): Status: ACTIVE | Noted: 2020-03-29

## 2020-03-31 LAB
ALBUMIN SERPL BCP-MCNC: 3.1 G/DL (ref 3.2–4.9)
ALBUMIN/GLOB SERPL: 0.8 G/DL
ALP SERPL-CCNC: 55 U/L (ref 30–99)
ALT SERPL-CCNC: 124 U/L (ref 2–50)
ANION GAP SERPL CALC-SCNC: 16 MMOL/L (ref 7–16)
AST SERPL-CCNC: 131 U/L (ref 12–45)
BASOPHILS # BLD AUTO: 0.2 % (ref 0–1.8)
BASOPHILS # BLD: 0.01 K/UL (ref 0–0.12)
BILIRUB SERPL-MCNC: 1 MG/DL (ref 0.1–1.5)
BUN SERPL-MCNC: 6 MG/DL (ref 8–22)
CALCIUM SERPL-MCNC: 8.2 MG/DL (ref 8.5–10.5)
CHLORIDE SERPL-SCNC: 103 MMOL/L (ref 96–112)
CO2 SERPL-SCNC: 15 MMOL/L (ref 20–33)
CREAT SERPL-MCNC: 0.58 MG/DL (ref 0.5–1.4)
CRP SERPL HS-MCNC: 3.35 MG/DL (ref 0–0.75)
D DIMER PPP IA.FEU-MCNC: 0.94 UG/ML (FEU) (ref 0–0.5)
EOSINOPHIL # BLD AUTO: 0 K/UL (ref 0–0.51)
EOSINOPHIL NFR BLD: 0 % (ref 0–6.9)
ERYTHROCYTE [DISTWIDTH] IN BLOOD BY AUTOMATED COUNT: 37 FL (ref 35.9–50)
FERRITIN SERPL-MCNC: 1226 NG/ML (ref 22–322)
GLOBULIN SER CALC-MCNC: 3.8 G/DL (ref 1.9–3.5)
GLUCOSE SERPL-MCNC: 89 MG/DL (ref 65–99)
HCT VFR BLD AUTO: 41 % (ref 42–52)
HGB BLD-MCNC: 14.1 G/DL (ref 14–18)
IMM GRANULOCYTES # BLD AUTO: 0.03 K/UL (ref 0–0.11)
IMM GRANULOCYTES NFR BLD AUTO: 0.6 % (ref 0–0.9)
LDH SERPL L TO P-CCNC: 493 U/L (ref 107–266)
LYMPHOCYTES # BLD AUTO: 1.21 K/UL (ref 1–4.8)
LYMPHOCYTES NFR BLD: 23.7 % (ref 22–41)
MCH RBC QN AUTO: 29 PG (ref 27–33)
MCHC RBC AUTO-ENTMCNC: 34.4 G/DL (ref 33.7–35.3)
MCV RBC AUTO: 84.4 FL (ref 81.4–97.8)
MONOCYTES # BLD AUTO: 0.31 K/UL (ref 0–0.85)
MONOCYTES NFR BLD AUTO: 6.1 % (ref 0–13.4)
NEUTROPHILS # BLD AUTO: 3.55 K/UL (ref 1.82–7.42)
NEUTROPHILS NFR BLD: 69.4 % (ref 44–72)
NRBC # BLD AUTO: 0 K/UL
NRBC BLD-RTO: 0 /100 WBC
PLATELET # BLD AUTO: 172 K/UL (ref 164–446)
PMV BLD AUTO: 9.3 FL (ref 9–12.9)
POTASSIUM SERPL-SCNC: 4.5 MMOL/L (ref 3.6–5.5)
PROT SERPL-MCNC: 6.9 G/DL (ref 6–8.2)
RBC # BLD AUTO: 4.86 M/UL (ref 4.7–6.1)
SARS-COV-2 RNA RESP QL NAA+PROBE: NOT DETECTED
SODIUM SERPL-SCNC: 134 MMOL/L (ref 135–145)
SPECIMEN SOURCE: NORMAL
WBC # BLD AUTO: 5.1 K/UL (ref 4.8–10.8)

## 2020-03-31 PROCEDURE — 71045 X-RAY EXAM CHEST 1 VIEW: CPT

## 2020-03-31 PROCEDURE — 99233 SBSQ HOSP IP/OBS HIGH 50: CPT | Performed by: FAMILY MEDICINE

## 2020-03-31 PROCEDURE — 306637 HCHG MISC ORTHO ITEM RC 0274

## 2020-03-31 PROCEDURE — 770021 HCHG ROOM/CARE - ISO PRIVATE

## 2020-03-31 PROCEDURE — 86140 C-REACTIVE PROTEIN: CPT

## 2020-03-31 PROCEDURE — 700111 HCHG RX REV CODE 636 W/ 250 OVERRIDE (IP): Performed by: HOSPITALIST

## 2020-03-31 PROCEDURE — 85379 FIBRIN DEGRADATION QUANT: CPT

## 2020-03-31 PROCEDURE — 36415 COLL VENOUS BLD VENIPUNCTURE: CPT

## 2020-03-31 PROCEDURE — 80053 COMPREHEN METABOLIC PANEL: CPT

## 2020-03-31 PROCEDURE — 85025 COMPLETE CBC W/AUTO DIFF WBC: CPT

## 2020-03-31 PROCEDURE — 90686 IIV4 VACC NO PRSV 0.5 ML IM: CPT | Performed by: HOSPITALIST

## 2020-03-31 PROCEDURE — 82728 ASSAY OF FERRITIN: CPT

## 2020-03-31 PROCEDURE — 83615 LACTATE (LD) (LDH) ENZYME: CPT

## 2020-03-31 PROCEDURE — 90471 IMMUNIZATION ADMIN: CPT

## 2020-03-31 RX ADMIN — INFLUENZA A VIRUS A/BRISBANE/02/2018 IVR-190 (H1N1) ANTIGEN (FORMALDEHYDE INACTIVATED), INFLUENZA A VIRUS A/KANSAS/14/2017 X-327 (H3N2) ANTIGEN (FORMALDEHYDE INACTIVATED), INFLUENZA B VIRUS B/PHUKET/3073/2013 ANTIGEN (FORMALDEHYDE INACTIVATED), AND INFLUENZA B VIRUS B/MARYLAND/15/2016 BX-69A ANTIGEN (FORMALDEHYDE INACTIVATED) 0.5 ML: 15; 15; 15; 15 INJECTION, SUSPENSION INTRAMUSCULAR at 22:27

## 2020-03-31 ASSESSMENT — ENCOUNTER SYMPTOMS
DIZZINESS: 0
WEAKNESS: 1
FEVER: 1
NECK PAIN: 0
BLURRED VISION: 0
DIAPHORESIS: 0
SHORTNESS OF BREATH: 0
FOCAL WEAKNESS: 0
ABDOMINAL PAIN: 0
MYALGIAS: 1
PALPITATIONS: 0
HEARTBURN: 0
NERVOUS/ANXIOUS: 0
NAUSEA: 1
SPEECH CHANGE: 0
FLANK PAIN: 0
SORE THROAT: 0
SENSORY CHANGE: 0
CHILLS: 0
BACK PAIN: 0
DIARRHEA: 1
WHEEZING: 0
HEADACHES: 0
COUGH: 1
VOMITING: 0

## 2020-03-31 NOTE — PROGRESS NOTES
[facility] Medicine Daily Progress Note    Date of Service  3/31/2020    Chief Complaint  24 y.o. male admitted 3/29/2020 with Pneumonia.    Hospital Course  Admitted with Pneumonia, Respiratory failure.    Interval Problem Update  Pneumonia - COVID negative, LDH and CRP trended up  Resp failure - O2 2 lpm NC    Consultants/Specialty  None    Code Status  Full    Disposition  TBD    Review of Systems  Review of Systems   Constitutional: Positive for fever and malaise/fatigue. Negative for chills and diaphoresis.   HENT: Negative for congestion, hearing loss and sore throat.    Eyes: Negative for blurred vision.   Respiratory: Positive for cough. Negative for shortness of breath and wheezing.    Cardiovascular: Negative for chest pain, palpitations and leg swelling.   Gastrointestinal: Positive for diarrhea and nausea. Negative for abdominal pain, heartburn and vomiting.   Genitourinary: Negative for dysuria, flank pain and hematuria.   Musculoskeletal: Positive for myalgias. Negative for back pain, joint pain and neck pain.   Skin: Negative for rash.   Neurological: Positive for weakness. Negative for dizziness, sensory change, speech change, focal weakness and headaches.   Psychiatric/Behavioral: The patient is not nervous/anxious.         Physical Exam  Temp:  [37.2 °C (99 °F)-38.8 °C (101.8 °F)] 37.4 °C (99.3 °F)  Pulse:  [] 93  Resp:  [16-20] 20  BP: (109-114)/(65-69) 111/65  SpO2:  [93 %-97 %] 95 %    Physical Exam  Constitutional:       Appearance: He is obese.   HENT:      Head: Normocephalic and atraumatic.      Nose: No congestion.      Mouth/Throat:      Mouth: Mucous membranes are moist.   Eyes:      Conjunctiva/sclera: Conjunctivae normal.      Pupils: Pupils are equal, round, and reactive to light.   Neck:      Musculoskeletal: No muscular tenderness.   Cardiovascular:      Rate and Rhythm: Normal rate and regular rhythm.   Pulmonary:      Effort: Pulmonary effort is normal.      Breath sounds:  Rales present.   Abdominal:      General: Bowel sounds are normal. There is no distension.      Tenderness: There is no abdominal tenderness. There is no guarding or rebound.   Musculoskeletal:      Right lower leg: No edema.      Left lower leg: No edema.   Lymphadenopathy:      Cervical: No cervical adenopathy.   Skin:     General: Skin is warm and dry.   Neurological:      General: No focal deficit present.      Mental Status: He is alert and oriented to person, place, and time.      Cranial Nerves: No cranial nerve deficit.         Fluids  No intake or output data in the 24 hours ending 03/31/20 1454    Laboratory  Recent Labs     03/30/20 0223 03/30/20  0938 03/31/20  0442   WBC 4.0* 4.4* 5.1   RBC 5.44 5.43 4.86   HEMOGLOBIN 15.4 15.4 14.1   HEMATOCRIT 46.1 46.3 41.0*   MCV 84.7 85.3 84.4   MCH 28.3 28.4 29.0   MCHC 33.4* 33.3* 34.4   RDW 37.2 38.1 37.0   PLATELETCT 162* 179 172   MPV 9.3 9.7 9.3     Recent Labs     03/30/20 0223 03/30/20 0938 03/31/20  0442   SODIUM 139 136 134*   POTASSIUM 4.3 4.0 4.5   CHLORIDE 107 103 103   CO2 22 22 15*   GLUCOSE 90 95 89   BUN 9 8 6*   CREATININE 0.86 0.79 0.58   CALCIUM 7.5* 7.7* 8.2*                   Imaging  DX-CHEST-PORTABLE (1 VIEW)   Final Result      Minimal interstitial opacity in the right lower lobe laterally consistent with minimal pneumonia.      CT-CTA CHEST PULMONARY ARTERY W/ RECONS   Final Result         1.  No evidence of pulmonary embolism.   2.  Bilateral groundglass and consolidative opacities consistent with multifocal pneumonia. Commonly reported imaging features of COVID-19 pneumonia are present. Other processes such as other infectious pneumonias, drug toxicity or connective tissue    disease can cause a similar imaging pattern.   3.  Mild hilar and mediastinal adenopathy, likely reactive.   4.  Hepatic steatosis.   5.  Mild splenomegaly.      These findings were discussed with ANNE CAREY on 3/30/2020 2:05 PM.               DX-CHEST-PORTABLE  (1 VIEW)   Final Result      Poor inspiration with hypoventilatory change with bibasilar atelectasis versus infiltrate.           Assessment/Plan  * Viral pneumonia- (present on admission)  Assessment & Plan  COVID 19 negative - suspicion is high  Follow LDH, CRP, procalcitonin    Acute respiratory failure with hypoxia (HCC)- (present on admission)  Assessment & Plan  Treatment as noted above.    Elevated LFTs- (present on admission)  Assessment & Plan  Follow CMP       VTE prophylaxis: Lovenox

## 2020-03-31 NOTE — CONSULTS
Referral: COVID negative [facility] high clinical suspicion    Per H&P he presented with 1 week of productive cough associated with subjective fevers, chills, and a gradually decreasing sensation of taste and smell.  Patient denies any headaches, myalgias, but in the last day he did develop episodes of vomiting with nausea and diarrhea.  He has had no other sick contacts that he is aware of and no one else is ill in his home.  He has had no recent travel.    Chief complaint:    Chief Complaint   Patient presents with   • Shortness of Breath     cough x 2 days   • N/V     started today   • Diarrhea     x 1 today     Date of admission:  3/29/2020  Date of consult:  3/31/2020  Travel History:  None per H&P  Primary residence:  [address] [place] [id number]  Direct contact with confirmed COVID: None known per H&P  Oxygen support: 2 L NC  Imaging:  CTA 3/31: Bilateral groundglass and consolidative opacities consistent with multifocal pneumonia. Commonly reported imaging features of COVID-19 pneumonia   WBC (K/ul):  5.1   Absolute Lymphocyte Count (ALC):  1210 (800 on admit)   Meet [facility] PUI Screening criteria? Yes, COVID-19 testing done on 3/29 and negative.     Recommendation:    --- Repeat COVID-19 testing due to high clinical suspicion, ongoing symptoms, consistent imaging and no alternate diagnosis. I placed order.   --- Follow-up repeat testing and continue in droplet/contact/eye isolation     Infection Prevention called?  :  Yes, let IP know that we are sending a 2md COVID-19

## 2020-03-31 NOTE — PROGRESS NOTES
Received report from NOC RN. Assumed care of patient at 0700. Patient A&Ox 4, speaking in full sentences, follows commands and responds appropriately to questions. On 2L NC, no signs of respiratory distress. Respirations are even and unlabored. Patient states 0/10 pain at this time. POC discussed and agreed upon with patient. Call light and belongings within reach. Bed in lowest locked position. Upper side rails raised. Fall risk precautions in place. Hourly rounding. Will continue to monitor.

## 2020-03-31 NOTE — PROGRESS NOTES
A/o,respirations are even and unlabored on 2L sating at 93%, assessment completed, vital signs stable except for elevated temperature and HR,tylenol and cooling measures given updated communication board,  poc discussed and understood, verbalized understanding, all questions answered at this time , fall precautions in place, call button within reach, will continue to monitor

## 2020-04-01 LAB
ALBUMIN SERPL BCP-MCNC: 3.3 G/DL (ref 3.2–4.9)
ALBUMIN/GLOB SERPL: 1 G/DL
ALP SERPL-CCNC: 52 U/L (ref 30–99)
ALT SERPL-CCNC: 130 U/L (ref 2–50)
ANION GAP SERPL CALC-SCNC: 10 MMOL/L (ref 7–16)
ANISOCYTOSIS BLD QL SMEAR: ABNORMAL
AST SERPL-CCNC: 150 U/L (ref 12–45)
BASOPHILS # BLD AUTO: 0 % (ref 0–1.8)
BASOPHILS # BLD: 0 K/UL (ref 0–0.12)
BILIRUB SERPL-MCNC: 0.9 MG/DL (ref 0.1–1.5)
BUN SERPL-MCNC: 7 MG/DL (ref 8–22)
CALCIUM SERPL-MCNC: 8 MG/DL (ref 8.5–10.5)
CHLORIDE SERPL-SCNC: 105 MMOL/L (ref 96–112)
CO2 SERPL-SCNC: 20 MMOL/L (ref 20–33)
COVID ORDER STATUS COVID19: NORMAL
CREAT SERPL-MCNC: 0.53 MG/DL (ref 0.5–1.4)
CRP SERPL HS-MCNC: 4.26 MG/DL (ref 0–0.75)
EOSINOPHIL # BLD AUTO: 0 K/UL (ref 0–0.51)
EOSINOPHIL NFR BLD: 0 % (ref 0–6.9)
ERYTHROCYTE [DISTWIDTH] IN BLOOD BY AUTOMATED COUNT: 36.5 FL (ref 35.9–50)
GLOBULIN SER CALC-MCNC: 3.2 G/DL (ref 1.9–3.5)
GLUCOSE SERPL-MCNC: 99 MG/DL (ref 65–99)
HCT VFR BLD AUTO: 44.7 % (ref 42–52)
HGB BLD-MCNC: 14.9 G/DL (ref 14–18)
LDH SERPL L TO P-CCNC: 484 U/L (ref 107–266)
LYMPHOCYTES # BLD AUTO: 1.05 K/UL (ref 1–4.8)
LYMPHOCYTES NFR BLD: 24.5 % (ref 22–41)
MANUAL DIFF BLD: NORMAL
MCH RBC QN AUTO: 28.1 PG (ref 27–33)
MCHC RBC AUTO-ENTMCNC: 33.3 G/DL (ref 33.7–35.3)
MCV RBC AUTO: 84.2 FL (ref 81.4–97.8)
METAMYELOCYTES NFR BLD MANUAL: 0.9 %
MICROCYTES BLD QL SMEAR: ABNORMAL
MONOCYTES # BLD AUTO: 0.19 K/UL (ref 0–0.85)
MONOCYTES NFR BLD AUTO: 4.4 % (ref 0–13.4)
MORPHOLOGY BLD-IMP: NORMAL
NEUTROPHILS # BLD AUTO: 3.02 K/UL (ref 1.82–7.42)
NEUTROPHILS NFR BLD: 70.2 % (ref 44–72)
NRBC # BLD AUTO: 0 K/UL
NRBC BLD-RTO: 0 /100 WBC
PLATELET # BLD AUTO: 211 K/UL (ref 164–446)
PLATELET BLD QL SMEAR: NORMAL
PMV BLD AUTO: 9.3 FL (ref 9–12.9)
POTASSIUM SERPL-SCNC: 3.9 MMOL/L (ref 3.6–5.5)
PROT SERPL-MCNC: 6.5 G/DL (ref 6–8.2)
RBC # BLD AUTO: 5.31 M/UL (ref 4.7–6.1)
RBC BLD AUTO: PRESENT
SODIUM SERPL-SCNC: 135 MMOL/L (ref 135–145)
WBC # BLD AUTO: 4.3 K/UL (ref 4.8–10.8)

## 2020-04-01 PROCEDURE — 85007 BL SMEAR W/DIFF WBC COUNT: CPT

## 2020-04-01 PROCEDURE — 86140 C-REACTIVE PROTEIN: CPT

## 2020-04-01 PROCEDURE — 36415 COLL VENOUS BLD VENIPUNCTURE: CPT

## 2020-04-01 PROCEDURE — 770021 HCHG ROOM/CARE - ISO PRIVATE

## 2020-04-01 PROCEDURE — 85027 COMPLETE CBC AUTOMATED: CPT

## 2020-04-01 PROCEDURE — 99233 SBSQ HOSP IP/OBS HIGH 50: CPT | Performed by: FAMILY MEDICINE

## 2020-04-01 PROCEDURE — 83615 LACTATE (LD) (LDH) ENZYME: CPT

## 2020-04-01 PROCEDURE — 306637 HCHG MISC ORTHO ITEM RC 0274

## 2020-04-01 PROCEDURE — 80053 COMPREHEN METABOLIC PANEL: CPT

## 2020-04-01 ASSESSMENT — ENCOUNTER SYMPTOMS
DIARRHEA: 1
SHORTNESS OF BREATH: 0
BACK PAIN: 0
NAUSEA: 1
VOMITING: 0
HEADACHES: 0
SENSORY CHANGE: 0
DIZZINESS: 0
NERVOUS/ANXIOUS: 0
FLANK PAIN: 0
BLURRED VISION: 0
PALPITATIONS: 0
WHEEZING: 0
CHILLS: 0
WEAKNESS: 1
HEARTBURN: 0
SPEECH CHANGE: 0
MYALGIAS: 1
SORE THROAT: 0
FOCAL WEAKNESS: 0
FEVER: 1
COUGH: 1
NECK PAIN: 0
ABDOMINAL PAIN: 0
DIAPHORESIS: 0

## 2020-04-01 NOTE — PROGRESS NOTES
Hospital Medicine Daily Progress Note    Date of Service  4/1/2020    Chief Complaint  24 y.o. male admitted 3/29/2020 with Pneumonia.    Hospital Course  Admitted with Pneumonia, Respiratory failure.    Interval Problem Update  Pneumonia - COVID-19 negative, repeat done, LDH and CRP trending up  Resp failure - O2 2 lpm NC    Consultants/Specialty  ID    Code Status  Full    Disposition  TBD    Review of Systems  Review of Systems   Constitutional: Positive for fever and malaise/fatigue. Negative for chills and diaphoresis.   HENT: Negative for congestion, hearing loss and sore throat.    Eyes: Negative for blurred vision.   Respiratory: Positive for cough. Negative for shortness of breath and wheezing.    Cardiovascular: Negative for chest pain, palpitations and leg swelling.   Gastrointestinal: Positive for diarrhea and nausea. Negative for abdominal pain, heartburn and vomiting.   Genitourinary: Negative for dysuria, flank pain and hematuria.   Musculoskeletal: Positive for myalgias. Negative for back pain, joint pain and neck pain.   Skin: Negative for rash.   Neurological: Positive for weakness. Negative for dizziness, sensory change, speech change, focal weakness and headaches.   Psychiatric/Behavioral: The patient is not nervous/anxious.         Physical Exam  Temp:  [36.5 °C (97.7 °F)-37.2 °C (98.9 °F)] 36.5 °C (97.7 °F)  Pulse:  [66-94] 73  Resp:  [16-18] 16  BP: (108-111)/(58-68) 108/63  SpO2:  [90 %-95 %] 94 %    Physical Exam  Vitals signs and nursing note reviewed.   Constitutional:       Appearance: He is obese.   HENT:      Head: Normocephalic and atraumatic.      Nose: No congestion.      Mouth/Throat:      Mouth: Mucous membranes are moist.   Eyes:      Conjunctiva/sclera: Conjunctivae normal.      Pupils: Pupils are equal, round, and reactive to light.   Neck:      Musculoskeletal: No muscular tenderness.   Cardiovascular:      Rate and Rhythm: Normal rate and regular rhythm.   Pulmonary:       Effort: Pulmonary effort is normal.      Breath sounds: Rales present.   Abdominal:      General: Bowel sounds are normal. There is no distension.      Tenderness: There is no abdominal tenderness. There is no guarding or rebound.   Musculoskeletal:      Right lower leg: No edema.      Left lower leg: No edema.   Lymphadenopathy:      Cervical: No cervical adenopathy.   Skin:     General: Skin is warm and dry.   Neurological:      General: No focal deficit present.      Mental Status: He is alert and oriented to person, place, and time.      Cranial Nerves: No cranial nerve deficit.         Fluids  No intake or output data in the 24 hours ending 04/01/20 1404    Laboratory  Recent Labs     03/30/20  0938 03/31/20  0442 04/01/20  0150   WBC 4.4* 5.1 4.3*   RBC 5.43 4.86 5.31   HEMOGLOBIN 15.4 14.1 14.9   HEMATOCRIT 46.3 41.0* 44.7   MCV 85.3 84.4 84.2   MCH 28.4 29.0 28.1   MCHC 33.3* 34.4 33.3*   RDW 38.1 37.0 36.5   PLATELETCT 179 172 211   MPV 9.7 9.3 9.3     Recent Labs     03/30/20  0938 03/31/20  0442 04/01/20  0150   SODIUM 136 134* 135   POTASSIUM 4.0 4.5 3.9   CHLORIDE 103 103 105   CO2 22 15* 20   GLUCOSE 95 89 99   BUN 8 6* 7*   CREATININE 0.79 0.58 0.53   CALCIUM 7.7* 8.2* 8.0*                   Imaging  DX-CHEST-PORTABLE (1 VIEW)   Final Result      Minimal interstitial opacity in the right lower lobe laterally consistent with minimal pneumonia.      CT-CTA CHEST PULMONARY ARTERY W/ RECONS   Final Result         1.  No evidence of pulmonary embolism.   2.  Bilateral groundglass and consolidative opacities consistent with multifocal pneumonia. Commonly reported imaging features of COVID-19 pneumonia are present. Other processes such as other infectious pneumonias, drug toxicity or connective tissue    disease can cause a similar imaging pattern.   3.  Mild hilar and mediastinal adenopathy, likely reactive.   4.  Hepatic steatosis.   5.  Mild splenomegaly.      These findings were discussed with ANNE SCALES  AURELIO on 3/30/2020 2:05 PM.               DX-CHEST-PORTABLE (1 VIEW)   Final Result      Poor inspiration with hypoventilatory change with bibasilar atelectasis versus infiltrate.           Assessment/Plan  * Viral pneumonia- (present on admission)  Assessment & Plan  COVID-19 negative   Repeat COVID-19 pending  Follow LDH, CRP, procalcitonin    Acute respiratory failure with hypoxia (HCC)- (present on admission)  Assessment & Plan  RT protocol    Elevated LFTs- (present on admission)  Assessment & Plan  Follow CMP       VTE prophylaxis: Lovenox

## 2020-04-01 NOTE — PROGRESS NOTES
Rounded pt. Vital signs stable. Pt still desating to 88% with room air, pr now in 2L of oxygen sating at 91%,  afebrile, flu shot given, poc discussed, pt verbalized understanding

## 2020-04-02 LAB
ALBUMIN SERPL BCP-MCNC: 3.3 G/DL (ref 3.2–4.9)
ALBUMIN/GLOB SERPL: 1 G/DL
ALP SERPL-CCNC: 57 U/L (ref 30–99)
ALT SERPL-CCNC: 149 U/L (ref 2–50)
ANION GAP SERPL CALC-SCNC: 11 MMOL/L (ref 7–16)
ANISOCYTOSIS BLD QL SMEAR: ABNORMAL
AST SERPL-CCNC: 129 U/L (ref 12–45)
BASOPHILS # BLD AUTO: 0 % (ref 0–1.8)
BASOPHILS # BLD: 0 K/UL (ref 0–0.12)
BILIRUB SERPL-MCNC: 0.8 MG/DL (ref 0.1–1.5)
BUN SERPL-MCNC: 10 MG/DL (ref 8–22)
BURR CELLS BLD QL SMEAR: NORMAL
CALCIUM SERPL-MCNC: 8.4 MG/DL (ref 8.5–10.5)
CHLORIDE SERPL-SCNC: 106 MMOL/L (ref 96–112)
CO2 SERPL-SCNC: 22 MMOL/L (ref 20–33)
CREAT SERPL-MCNC: 0.54 MG/DL (ref 0.5–1.4)
CRP SERPL HS-MCNC: 2.28 MG/DL (ref 0–0.75)
EOSINOPHIL # BLD AUTO: 0.18 K/UL (ref 0–0.51)
EOSINOPHIL NFR BLD: 4.4 % (ref 0–6.9)
ERYTHROCYTE [DISTWIDTH] IN BLOOD BY AUTOMATED COUNT: 36.7 FL (ref 35.9–50)
GLOBULIN SER CALC-MCNC: 3.4 G/DL (ref 1.9–3.5)
GLUCOSE SERPL-MCNC: 98 MG/DL (ref 65–99)
HCT VFR BLD AUTO: 43.6 % (ref 42–52)
HGB BLD-MCNC: 15.1 G/DL (ref 14–18)
LDH SERPL L TO P-CCNC: 389 U/L (ref 107–266)
LG PLATELETS BLD QL SMEAR: NORMAL
LYMPHOCYTES # BLD AUTO: 1.67 K/UL (ref 1–4.8)
LYMPHOCYTES NFR BLD: 39.8 % (ref 22–41)
MANUAL DIFF BLD: NORMAL
MCH RBC QN AUTO: 28.7 PG (ref 27–33)
MCHC RBC AUTO-ENTMCNC: 34.6 G/DL (ref 33.7–35.3)
MCV RBC AUTO: 82.7 FL (ref 81.4–97.8)
MICROCYTES BLD QL SMEAR: ABNORMAL
MONOCYTES # BLD AUTO: 0.3 K/UL (ref 0–0.85)
MONOCYTES NFR BLD AUTO: 7.1 % (ref 0–13.4)
MORPHOLOGY BLD-IMP: NORMAL
NEUTROPHILS # BLD AUTO: 2.05 K/UL (ref 1.82–7.42)
NEUTROPHILS NFR BLD: 47.8 % (ref 44–72)
NEUTS BAND NFR BLD MANUAL: 0.9 % (ref 0–10)
NRBC # BLD AUTO: 0 K/UL
NRBC BLD-RTO: 0 /100 WBC
PLATELET # BLD AUTO: 233 K/UL (ref 164–446)
PLATELET BLD QL SMEAR: NORMAL
PMV BLD AUTO: 8.9 FL (ref 9–12.9)
POIKILOCYTOSIS BLD QL SMEAR: NORMAL
POTASSIUM SERPL-SCNC: 3.7 MMOL/L (ref 3.6–5.5)
PROT SERPL-MCNC: 6.7 G/DL (ref 6–8.2)
RBC # BLD AUTO: 5.27 M/UL (ref 4.7–6.1)
RBC BLD AUTO: PRESENT
SARS-COV-2 RNA RESP QL NAA+PROBE: NOT DETECTED
SMUDGE CELLS BLD QL SMEAR: NORMAL
SODIUM SERPL-SCNC: 139 MMOL/L (ref 135–145)
SPECIMEN SOURCE: NORMAL
WBC # BLD AUTO: 4.2 K/UL (ref 4.8–10.8)

## 2020-04-02 PROCEDURE — 306637 HCHG MISC ORTHO ITEM RC 0274

## 2020-04-02 PROCEDURE — 85027 COMPLETE CBC AUTOMATED: CPT

## 2020-04-02 PROCEDURE — 36415 COLL VENOUS BLD VENIPUNCTURE: CPT

## 2020-04-02 PROCEDURE — 85007 BL SMEAR W/DIFF WBC COUNT: CPT

## 2020-04-02 PROCEDURE — 99232 SBSQ HOSP IP/OBS MODERATE 35: CPT | Performed by: FAMILY MEDICINE

## 2020-04-02 PROCEDURE — 80053 COMPREHEN METABOLIC PANEL: CPT

## 2020-04-02 PROCEDURE — 83615 LACTATE (LD) (LDH) ENZYME: CPT

## 2020-04-02 PROCEDURE — 86140 C-REACTIVE PROTEIN: CPT

## 2020-04-02 PROCEDURE — 770021 HCHG ROOM/CARE - ISO PRIVATE

## 2020-04-02 ASSESSMENT — ENCOUNTER SYMPTOMS
WEAKNESS: 1
CHILLS: 0
FOCAL WEAKNESS: 0
SORE THROAT: 0
DIARRHEA: 0
HEADACHES: 0
SENSORY CHANGE: 0
HEARTBURN: 0
VOMITING: 0
SPEECH CHANGE: 0
FLANK PAIN: 0
BLURRED VISION: 0
DIAPHORESIS: 0
NERVOUS/ANXIOUS: 0
WHEEZING: 0
COUGH: 1
SHORTNESS OF BREATH: 0
PALPITATIONS: 0
MYALGIAS: 1
DIZZINESS: 0
NECK PAIN: 0
FEVER: 0
ABDOMINAL PAIN: 0
BACK PAIN: 0
NAUSEA: 1

## 2020-04-02 ASSESSMENT — PATIENT HEALTH QUESTIONNAIRE - PHQ9
1. LITTLE INTEREST OR PLEASURE IN DOING THINGS: NOT AT ALL
SUM OF ALL RESPONSES TO PHQ9 QUESTIONS 1 AND 2: 0
2. FEELING DOWN, DEPRESSED, IRRITABLE, OR HOPELESS: NOT AT ALL

## 2020-04-02 ASSESSMENT — PAIN SCALES - WONG BAKER: WONGBAKER_NUMERICALRESPONSE: DOESN'T HURT AT ALL

## 2020-04-02 NOTE — PROGRESS NOTES
Hospital Medicine Daily Progress Note    Date of Service  4/2/2020    Chief Complaint  24 y.o. male admitted 3/29/2020 with Pneumonia.    Hospital Course  Admitted with Pneumonia, Respiratory failure.    Interval Problem Update  Pneumonia - repeat COVID-19 pending  Resp failure - O2 1 lpm NC    Consultants/Specialty  ID    Code Status  Full    Disposition  Home O2?    Review of Systems  Review of Systems   Constitutional: Positive for malaise/fatigue. Negative for chills, diaphoresis and fever.   HENT: Negative for congestion, hearing loss and sore throat.    Eyes: Negative for blurred vision.   Respiratory: Positive for cough. Negative for shortness of breath and wheezing.    Cardiovascular: Negative for chest pain, palpitations and leg swelling.   Gastrointestinal: Positive for nausea. Negative for abdominal pain, diarrhea, heartburn and vomiting.   Genitourinary: Negative for dysuria, flank pain and hematuria.   Musculoskeletal: Positive for myalgias. Negative for back pain, joint pain and neck pain.   Skin: Negative for rash.   Neurological: Positive for weakness. Negative for dizziness, sensory change, speech change, focal weakness and headaches.   Psychiatric/Behavioral: The patient is not nervous/anxious.         Physical Exam  Temp:  [36.3 °C (97.4 °F)-36.5 °C (97.7 °F)] 36.3 °C (97.4 °F)  Pulse:  [55-75] 75  Resp:  [16-18] 16  BP: (106-112)/(58-63) 109/62  SpO2:  [87 %-96 %] 91 %    Physical Exam  Vitals signs and nursing note reviewed.   Constitutional:       Appearance: He is obese.   HENT:      Head: Normocephalic and atraumatic.      Nose: No congestion.      Mouth/Throat:      Mouth: Mucous membranes are moist.   Eyes:      Conjunctiva/sclera: Conjunctivae normal.      Pupils: Pupils are equal, round, and reactive to light.   Neck:      Musculoskeletal: No muscular tenderness.   Cardiovascular:      Rate and Rhythm: Normal rate and regular rhythm.   Pulmonary:      Effort: Pulmonary effort is normal.       Breath sounds: Rales present.   Abdominal:      General: Bowel sounds are normal. There is no distension.      Tenderness: There is no abdominal tenderness. There is no guarding or rebound.   Musculoskeletal:      Right lower leg: No edema.      Left lower leg: No edema.   Lymphadenopathy:      Cervical: No cervical adenopathy.   Skin:     General: Skin is warm and dry.   Neurological:      General: No focal deficit present.      Mental Status: He is alert and oriented to person, place, and time.      Cranial Nerves: No cranial nerve deficit.         Fluids  No intake or output data in the 24 hours ending 04/02/20 1319    Laboratory  Recent Labs     03/31/20  0442 04/01/20  0150 04/02/20  0125   WBC 5.1 4.3* 4.2*   RBC 4.86 5.31 5.27   HEMOGLOBIN 14.1 14.9 15.1   HEMATOCRIT 41.0* 44.7 43.6   MCV 84.4 84.2 82.7   MCH 29.0 28.1 28.7   MCHC 34.4 33.3* 34.6   RDW 37.0 36.5 36.7   PLATELETCT 172 211 233   MPV 9.3 9.3 8.9*     Recent Labs     03/31/20  0442 04/01/20  0150 04/02/20  0125   SODIUM 134* 135 139   POTASSIUM 4.5 3.9 3.7   CHLORIDE 103 105 106   CO2 15* 20 22   GLUCOSE 89 99 98   BUN 6* 7* 10   CREATININE 0.58 0.53 0.54   CALCIUM 8.2* 8.0* 8.4*                   Imaging  DX-CHEST-PORTABLE (1 VIEW)   Final Result      Minimal interstitial opacity in the right lower lobe laterally consistent with minimal pneumonia.      CT-CTA CHEST PULMONARY ARTERY W/ RECONS   Final Result         1.  No evidence of pulmonary embolism.   2.  Bilateral groundglass and consolidative opacities consistent with multifocal pneumonia. Commonly reported imaging features of COVID-19 pneumonia are present. Other processes such as other infectious pneumonias, drug toxicity or connective tissue    disease can cause a similar imaging pattern.   3.  Mild hilar and mediastinal adenopathy, likely reactive.   4.  Hepatic steatosis.   5.  Mild splenomegaly.      These findings were discussed with ANNE CAREY on 3/30/2020 2:05 PM.                DX-CHEST-PORTABLE (1 VIEW)   Final Result      Poor inspiration with hypoventilatory change with bibasilar atelectasis versus infiltrate.           Assessment/Plan  * Viral pneumonia- (present on admission)  Assessment & Plan  Repeat COVID-19 pending  Follow LDH, CRP, procalcitonin    Acute respiratory failure with hypoxia (HCC)- (present on admission)  Assessment & Plan  RT protocol    Elevated LFTs- (present on admission)  Assessment & Plan  Follow CMP       VTE prophylaxis: Lovenox

## 2020-04-02 NOTE — PROGRESS NOTES
Rounded pt, afebrile, vital signs stable, will try to wean off pt from oxygen, reduce oxygen to 1L, will continue to monitor.

## 2020-04-02 NOTE — PROGRESS NOTES
Attempted to wean off pt from oxygen, pt still desating to 87-89% with room air, pt placed in 1L of oxygen now, pt sating now to 95%.

## 2020-04-03 VITALS
SYSTOLIC BLOOD PRESSURE: 109 MMHG | HEART RATE: 64 BPM | OXYGEN SATURATION: 93 % | DIASTOLIC BLOOD PRESSURE: 61 MMHG | WEIGHT: 297.62 LBS | RESPIRATION RATE: 16 BRPM | BODY MASS INDEX: 44.08 KG/M2 | HEIGHT: 69 IN | TEMPERATURE: 97.3 F

## 2020-04-03 LAB
ALBUMIN SERPL BCP-MCNC: 3.3 G/DL (ref 3.2–4.9)
ALBUMIN/GLOB SERPL: 1 G/DL
ALP SERPL-CCNC: 60 U/L (ref 30–99)
ALT SERPL-CCNC: 125 U/L (ref 2–50)
ANION GAP SERPL CALC-SCNC: 12 MMOL/L (ref 7–16)
ANISOCYTOSIS BLD QL SMEAR: NORMAL
AST SERPL-CCNC: 74 U/L (ref 12–45)
BACTERIA BLD CULT: NORMAL
BACTERIA BLD CULT: NORMAL
BASOPHILS # BLD AUTO: 0 % (ref 0–1.8)
BASOPHILS # BLD: 0 K/UL (ref 0–0.12)
BILIRUB SERPL-MCNC: 1 MG/DL (ref 0.1–1.5)
BUN SERPL-MCNC: 9 MG/DL (ref 8–22)
CALCIUM SERPL-MCNC: 8.5 MG/DL (ref 8.5–10.5)
CHLORIDE SERPL-SCNC: 106 MMOL/L (ref 96–112)
CO2 SERPL-SCNC: 23 MMOL/L (ref 20–33)
CREAT SERPL-MCNC: 0.63 MG/DL (ref 0.5–1.4)
CRP SERPL HS-MCNC: 1.02 MG/DL (ref 0–0.75)
EOSINOPHIL # BLD AUTO: 0.09 K/UL (ref 0–0.51)
EOSINOPHIL NFR BLD: 1.7 % (ref 0–6.9)
ERYTHROCYTE [DISTWIDTH] IN BLOOD BY AUTOMATED COUNT: 36.3 FL (ref 35.9–50)
GLOBULIN SER CALC-MCNC: 3.3 G/DL (ref 1.9–3.5)
GLUCOSE SERPL-MCNC: 85 MG/DL (ref 65–99)
HCT VFR BLD AUTO: 43.2 % (ref 42–52)
HGB BLD-MCNC: 15 G/DL (ref 14–18)
LDH SERPL L TO P-CCNC: 263 U/L (ref 107–266)
LYMPHOCYTES # BLD AUTO: 1.38 K/UL (ref 1–4.8)
LYMPHOCYTES NFR BLD: 27 % (ref 22–41)
MANUAL DIFF BLD: NORMAL
MCH RBC QN AUTO: 28.8 PG (ref 27–33)
MCHC RBC AUTO-ENTMCNC: 34.7 G/DL (ref 33.7–35.3)
MCV RBC AUTO: 83.1 FL (ref 81.4–97.8)
MICROCYTES BLD QL SMEAR: NORMAL
MONOCYTES # BLD AUTO: 0.53 K/UL (ref 0–0.85)
MONOCYTES NFR BLD AUTO: 10.4 % (ref 0–13.4)
MORPHOLOGY BLD-IMP: NORMAL
MYELOCYTES NFR BLD MANUAL: 0.9 %
NEUTROPHILS # BLD AUTO: 3.06 K/UL (ref 1.82–7.42)
NEUTROPHILS NFR BLD: 59.1 % (ref 44–72)
NEUTS BAND NFR BLD MANUAL: 0.9 % (ref 0–10)
NRBC # BLD AUTO: 0 K/UL
NRBC BLD-RTO: 0 /100 WBC
PLATELET # BLD AUTO: 292 K/UL (ref 164–446)
PLATELET BLD QL SMEAR: NORMAL
PMV BLD AUTO: 9.2 FL (ref 9–12.9)
POTASSIUM SERPL-SCNC: 3.6 MMOL/L (ref 3.6–5.5)
PROCALCITONIN SERPL-MCNC: <0.05 NG/ML
PROT SERPL-MCNC: 6.6 G/DL (ref 6–8.2)
RBC # BLD AUTO: 5.2 M/UL (ref 4.7–6.1)
RBC BLD AUTO: PRESENT
SIGNIFICANT IND 70042: NORMAL
SIGNIFICANT IND 70042: NORMAL
SITE SITE: NORMAL
SITE SITE: NORMAL
SODIUM SERPL-SCNC: 141 MMOL/L (ref 135–145)
SOURCE SOURCE: NORMAL
SOURCE SOURCE: NORMAL
WBC # BLD AUTO: 5.1 K/UL (ref 4.8–10.8)

## 2020-04-03 PROCEDURE — 86140 C-REACTIVE PROTEIN: CPT

## 2020-04-03 PROCEDURE — 99239 HOSP IP/OBS DSCHRG MGMT >30: CPT | Performed by: FAMILY MEDICINE

## 2020-04-03 PROCEDURE — 84145 PROCALCITONIN (PCT): CPT

## 2020-04-03 PROCEDURE — 80053 COMPREHEN METABOLIC PANEL: CPT

## 2020-04-03 PROCEDURE — 83615 LACTATE (LD) (LDH) ENZYME: CPT

## 2020-04-03 PROCEDURE — 85027 COMPLETE CBC AUTOMATED: CPT

## 2020-04-03 PROCEDURE — 85007 BL SMEAR W/DIFF WBC COUNT: CPT

## 2020-04-03 RX ORDER — ACETAMINOPHEN 500 MG
500-1000 TABLET ORAL EVERY 6 HOURS PRN
Qty: 60 TAB | Refills: 0 | Status: SHIPPED
Start: 2020-04-03 | End: 2020-05-04

## 2020-04-03 ASSESSMENT — PAIN SCALES - WONG BAKER
WONGBAKER_NUMERICALRESPONSE: DOESN'T HURT AT ALL

## 2020-04-03 NOTE — DISCHARGE PLANNING
Agency/Facility Name: [facility]  Spoke To: Cris  Outcome: Phone # for unit provided to speak with patient. Will deliver all 02 equipment to bedside for discharge.

## 2020-04-03 NOTE — PROGRESS NOTES
MD visited pt,for discharge, IV D/C'd. Discharge instructions provided to pt. Pt states understanding. Pt states all questions have been answered. Copy of discharge provided to pt. Signed copy in chart. Prescriptions provided to pt, copy in chart. Pt states that all personal belongings are in possession. Pt awaiting for ride.

## 2020-04-03 NOTE — DISCHARGE INSTRUCTIONS
Self-Isolating at Home  If it is determined that you do not need to be hospitalized and can be isolated at home please follow the follow the prevention steps below as based on CDC guidelines.  Stay home except to get medical care  People who are mildly ill with unconfirmed COVID-19 or have any other infectious respiratory illness are able to isolate at home during their illness. You should restrict activities outside your home, except for getting medical care. Do not go to work, school, or public areas. Avoid using public transportation, ride-sharing, or taxis.  Call ahead before visiting your doctor  If you have a medical appointment, call the healthcare provider and tell them that you have or may have unconfirmed COVID-19 or another possibly contagious respiratory illness. This will help the healthcare provider’s office take steps to keep other people from getting infected or exposed.  Separate yourself from other people and animals in your home  As much as possible, you should stay in a specific room and away from other people in your home. Also, you should use a separate bathroom, if available.  You should restrict contact with pets and other animals while you are sick, just like you would around other people. When possible, have another member of your household care for your animals while you are sick. If you must care for your pet or be around animals while you are sick, wash your hands before and after you interact with pets.  Wear a facemask  You should wear a facemask when you are around other people (e.g., sharing a room or vehicle) or pets and before you enter a healthcare provider’s office. If you are not able to wear a facemask (for example, because it causes trouble breathing), then people who live with you should not stay in the same room with you, or they should wear a facemask if they enter your room.  Cover your coughs and sneezes  Cover your mouth and nose with a tissue when you cough or sneeze.  Throw used tissues in a lined trash can. Immediately wash your hands with soap and water for at least 20 seconds or, if soap and water are not available, clean your hands with an alcohol-based hand  that contains at least 60% alcohol.  Clean your hands often  Wash your hands often with soap and water for at least 20 seconds, especially after blowing your nose, coughing, or sneezing; going to the bathroom; and before eating or preparing food. If soap and water are not readily available, use an alcohol-based hand  with at least 60% alcohol, covering all surfaces of your hands and rubbing them together until they feel dry.  Soap and water are the best option if hands are visibly dirty. Avoid touching your eyes, nose, and mouth with unwashed hands.  Avoid sharing personal household items  You should not share dishes, drinking glasses, cups, eating utensils, towels, or bedding with other people or pets in your home. After using these items, they should be washed thoroughly with soap and water.  Clean all “high-touch” surfaces everyday  High touch surfaces include counters, tabletops, doorknobs, bathroom fixtures, toilets, phones, keyboards, tablets, and bedside tables. Also, clean any surfaces that may have blood, stool, or body fluids on them. Use a household cleaning spray or wipe, according to the label instructions. Labels contain instructions for safe and effective use of the cleaning product including precautions you should take when applying the product, such as wearing gloves and making sure you have good ventilation during use of the product.  Monitor your symptoms  Seek prompt medical attention if your illness is worsening (e.g., difficulty breathing). Before seeking care, call your healthcare provider and tell them that you have, or are being evaluated for, unconfirmed COVID-19 or another infectious respiratory illness. Put on a facemask before you enter the facility. These steps will help  the healthcare provider’s office to keep other people in the office or waiting room from getting infected or exposed. Ask your healthcare provider to call the local or state health department. Persons who are placed under active monitoring or facilitated self-monitoring should follow instructions provided by their local health department or occupational health professionals, as appropriate. When working with your local health department check their available hours.  If you have a medical emergency and need to call 911, notify the dispatch personnel that you have, or are being evaluated for unconfirmed COVID-19 or another infectious respiratory illness. If possible, put on a facemask before emergency medical services arrive.  Discontinuing home isolation  Patients with unconfirmed COVID-19 or other infectious respiratory illnesses should remain under home isolation precautions until the risk of secondary transmission to others is thought to be low. In general that means 72 hours after fever resolves without the use of fever reducing medications, AND symptoms have improved AND at least 7 days since symptoms first appeared. If you have questions or concerns consult your healthcare providers or your local health department.  Per CDC guidelines, you are not required to provide a healthcare provider’s note to validate your illness or to return to work, as healthcare provider offices and medical facilities may be extremely busy and not able to provide such documentation in a timely way.    Discharge Instructions    Discharged to home by car with relative. Discharged via wheelchair, hospital escort: Yes.  Special equipment needed: Not Applicable    Be sure to schedule a follow-up appointment with your primary care doctor or any specialists as instructed.     Discharge Plan:   Diet Plan: Discussed  Activity Level: Discussed  Confirmed Follow up Appointment: Patient to Call and Schedule Appointment  Confirmed Symptoms  Management: Discussed  Medication Reconciliation Updated: Yes  Influenza Vaccine Indication: Indicated: 9 to 64 years of age  Influenza Vaccine Given - only chart on this line when given: Influenza Vaccine Given (See MAR)    I understand that a diet low in cholesterol, fat, and sodium is recommended for good health. Unless I have been given specific instructions below for another diet, I accept this instruction as my diet prescription.   Other diet:     Special Instructions: None    · Is patient discharged on Warfarin / Coumadin?   No     Depression / Suicide Risk    As you are discharged from this [facility] facility, it is important to learn how to keep safe from harming yourself.    Recognize the warning signs:  · Abrupt changes in personality, positive or negative- including increase in energy   · Giving away possessions  · Change in eating patterns- significant weight changes-  positive or negative  · Change in sleeping patterns- unable to sleep or sleeping all the time   · Unwillingness or inability to communicate  · Depression  · Unusual sadness, discouragement and loneliness  · Talk of wanting to die  · Neglect of personal appearance   · Rebelliousness- reckless behavior  · Withdrawal from people/activities they love  · Confusion- inability to concentrate     If you or a loved one observes any of these behaviors or has concerns about self-harm, here's what you can do:  · Talk about it- your feelings and reasons for harming yourself  · Remove any means that you might use to hurt yourself (examples: pills, rope, extension cords, firearm)  · Get professional help from the community (Mental Health, Substance Abuse, psychological counseling)  · Do not be alone:Call your Safe Contact- someone whom you trust who will be there for you.  · Call your local CRISIS HOTLINE 229-6838 or 266-247-5080  · Call your local Children's Mobile Crisis Response Team [place] (444) 162-1638 or www.ecomom  · Call  the toll free National Suicide Prevention Hotlines   · National Suicide Prevention Lifeline 066-679-REBL (6129)  · National Hope Line Network 800-SUICIDE (759-7646)

## 2020-04-03 NOTE — DISCHARGE SUMMARY
Discharge Summary    CHIEF COMPLAINT ON ADMISSION  Chief Complaint   Patient presents with   • Shortness of Breath     cough x 2 days   • N/V     started today   • Diarrhea     x 1 today       Reason for Admission  EMS     Admission Date  3/29/2020    CODE STATUS  Full Code    HPI & HOSPITAL COURSE  This is a 24 y.o. male here with viral pneumonia.  He was tested twice for COVID-19 which both came back negative.  However on review with the Covid task force and infection control there is strong clinical suspicion that he may be COVID positive.. He required oxygen supplementation, he has been weaned down but continues to require it on exertion.  His clinical symptoms have improved his numbers have trended down.  He is now afebrile.  Home oxygen has been arranged for him. He has been advised self Isolation until no fever for at least 72 hours without the use of fever reducing medications, symptoms have improved (ex: cough or shortness of breath have improved), 7 days have passed since symptoms first appeared or 3 days after symptoms have resolved whichever is longer.  He has also been advised to come back if his fever is worse or he has increased shortness of breath.       Therefore, he is discharged in good and stable condition to home with close outpatient follow-up.    The patient met 2-midnight criteria for an inpatient stay at the time of discharge.    Discharge Date  4/3/2020    FOLLOW UP ITEMS POST DISCHARGE  None    DISCHARGE DIAGNOSES  Principal Problem:    Viral pneumonia POA: Yes  Active Problems:    Acute respiratory failure with hypoxia (HCC) POA: Yes    Elevated LFTs POA: Yes  Resolved Problems:    * No resolved hospital problems. *      FOLLOW UP  No future appointments.  No follow-up provider specified.    MEDICATIONS ON DISCHARGE     Medication List      START taking these medications      Instructions   acetaminophen 500 MG Tabs  Commonly known as:  TYLENOL   Take 1-2 Tabs by mouth every 6 hours as  needed.  Dose:  500-1,000 mg            Allergies  No Known Allergies    DIET  Orders Placed This Encounter   Procedures   • Diet Order Regular     Standing Status:   Standing     Number of Occurrences:   1     Order Specific Question:   Diet:     Answer:   Regular [1]       ACTIVITY  As tolerated.  Weight bearing as tolerated    CONSULTATIONS  None    PROCEDURES  None    LABORATORY  Lab Results   Component Value Date    SODIUM 141 04/03/2020    POTASSIUM 3.6 04/03/2020    CHLORIDE 106 04/03/2020    CO2 23 04/03/2020    GLUCOSE 85 04/03/2020    BUN 9 04/03/2020    CREATININE 0.63 04/03/2020        Lab Results   Component Value Date    WBC 5.1 04/03/2020    HEMOGLOBIN 15.0 04/03/2020    HEMATOCRIT 43.2 04/03/2020    PLATELETCT 292 04/03/2020        Total time of the discharge process exceeds 40 minutes.

## 2020-04-03 NOTE — FACE TO FACE
"Face to Face Note  -  Durable Medical Equipment    Emeka Kumar M.D. - NPI: [id number]  I certify that this patient is under my care and that they had a durable medical equipment(DME)face to face encounter by myself that meets the physician DME face-to-face encounter requirements with this patient on:    Date of encounter:   Patient:                    MRN:                       YOB: 2020  Rebel Rogers  [id number]  1995     The encounter with the patient was in whole, or in part, for the following medical condition, which is the primary reason for durable medical equipment:  Other - Viral Pneumonia    I certify that, based on my findings, the following durable medical equipment is medically necessary:  Oxygen.    HOME O2 Saturation Measurements:(Values must be present for Home Oxygen orders)  Room air sat at rest: 91  Room air sat with amb: 88  With liters of O2: 1, O2 sat at rest with O2: 95   ,    Is the patient mobile?: Yes    My Clinical findings support the need for the above equipment due to:  Hypoxia    Supporting Symptoms: The patient requires supplemental oxygen, as the following interventions have been tried with limited or no improvement: \"Incentive spirometry    "

## 2020-04-03 NOTE — DISCHARGE PLANNING
Received request for home O2. Spoke with patient via phone. Choice made for [facility]. Choice filled out with verbal consent. Choice form faxed to Katelin MANDEL and faxed to unit fax [phone]. Called and spoke with Ruthie FLORES that faxed choice form be given to patient for contact info.

## 2020-04-03 NOTE — DISCHARGE PLANNING
Received Choice form at [phone]  Agency/Facility Name: [facility]  Referral sent per Choice form @ [phone]

## 2020-04-03 NOTE — CARE PLAN
Problem: Infection  Goal: Will remain free from infection  Outcome: PROGRESSING AS EXPECTED  Intervention: Implement standard precautions and perform hand washing before and after patient contact  Note: Implement isolation precautions.     Problem: Respiratory:  Goal: Respiratory status will improve  Outcome: PROGRESSING AS EXPECTED  Intervention: Administer and titrate oxygen therapy  Note: Monitor saturation and respiratory status.

## 2020-04-03 NOTE — PROGRESS NOTES
Received in bed, aox4. Assessment as per CDU. Call light within reach. Needs attended. Plan of care discussed and understood.

## 2020-04-03 NOTE — PROGRESS NOTES
Assessment completed. Pt A&Ox4.  Pt afebrile.  Pt denies pain at this time.  POC discussed, communication board updated.  Needs met, will continue to monitor

## 2020-05-04 ENCOUNTER — HOSPITAL ENCOUNTER (EMERGENCY)
Facility: MEDICAL CENTER | Age: 25
End: 2020-05-04
Attending: EMERGENCY MEDICINE
Payer: COMMERCIAL

## 2020-05-04 ENCOUNTER — APPOINTMENT (OUTPATIENT)
Dept: RADIOLOGY | Facility: MEDICAL CENTER | Age: 25
End: 2020-05-04
Attending: EMERGENCY MEDICINE
Payer: COMMERCIAL

## 2020-05-04 VITALS
HEIGHT: 69 IN | WEIGHT: 294.09 LBS | TEMPERATURE: 97.9 F | HEART RATE: 77 BPM | SYSTOLIC BLOOD PRESSURE: 126 MMHG | OXYGEN SATURATION: 99 % | BODY MASS INDEX: 43.56 KG/M2 | DIASTOLIC BLOOD PRESSURE: 75 MMHG | RESPIRATION RATE: 14 BRPM

## 2020-05-04 DIAGNOSIS — N20.0 KIDNEY STONE: ICD-10-CM

## 2020-05-04 DIAGNOSIS — R79.89 ELEVATED LFTS: ICD-10-CM

## 2020-05-04 DIAGNOSIS — K76.0 HEPATIC STEATOSIS: ICD-10-CM

## 2020-05-04 LAB
ALBUMIN SERPL BCP-MCNC: 4.7 G/DL (ref 3.2–4.9)
ALBUMIN/GLOB SERPL: 1.2 G/DL
ALP SERPL-CCNC: 104 U/L (ref 30–99)
ALT SERPL-CCNC: 198 U/L (ref 2–50)
ANION GAP SERPL CALC-SCNC: 14 MMOL/L (ref 7–16)
APPEARANCE UR: CLEAR
AST SERPL-CCNC: 123 U/L (ref 12–45)
BACTERIA #/AREA URNS HPF: NEGATIVE /HPF
BASOPHILS # BLD AUTO: 0.3 % (ref 0–1.8)
BASOPHILS # BLD: 0.05 K/UL (ref 0–0.12)
BILIRUB SERPL-MCNC: 1.7 MG/DL (ref 0.1–1.5)
BILIRUB UR QL STRIP.AUTO: NEGATIVE
BUN SERPL-MCNC: 12 MG/DL (ref 8–22)
CALCIUM SERPL-MCNC: 9.8 MG/DL (ref 8.5–10.5)
CHLORIDE SERPL-SCNC: 99 MMOL/L (ref 96–112)
CO2 SERPL-SCNC: 20 MMOL/L (ref 20–33)
COLOR UR: YELLOW
CREAT SERPL-MCNC: 0.79 MG/DL (ref 0.5–1.4)
EOSINOPHIL # BLD AUTO: 0.03 K/UL (ref 0–0.51)
EOSINOPHIL NFR BLD: 0.2 % (ref 0–6.9)
EPI CELLS #/AREA URNS HPF: NEGATIVE /HPF
ERYTHROCYTE [DISTWIDTH] IN BLOOD BY AUTOMATED COUNT: 38.5 FL (ref 35.9–50)
GLOBULIN SER CALC-MCNC: 3.9 G/DL (ref 1.9–3.5)
GLUCOSE SERPL-MCNC: 89 MG/DL (ref 65–99)
GLUCOSE UR STRIP.AUTO-MCNC: NEGATIVE MG/DL
HCT VFR BLD AUTO: 48.9 % (ref 42–52)
HGB BLD-MCNC: 16.8 G/DL (ref 14–18)
HYALINE CASTS #/AREA URNS LPF: ABNORMAL /LPF
IMM GRANULOCYTES # BLD AUTO: 0.08 K/UL (ref 0–0.11)
IMM GRANULOCYTES NFR BLD AUTO: 0.5 % (ref 0–0.9)
KETONES UR STRIP.AUTO-MCNC: NEGATIVE MG/DL
LEUKOCYTE ESTERASE UR QL STRIP.AUTO: NEGATIVE
LIPASE SERPL-CCNC: 28 U/L (ref 11–82)
LYMPHOCYTES # BLD AUTO: 1.93 K/UL (ref 1–4.8)
LYMPHOCYTES NFR BLD: 11.2 % (ref 22–41)
MCH RBC QN AUTO: 28.5 PG (ref 27–33)
MCHC RBC AUTO-ENTMCNC: 34.4 G/DL (ref 33.7–35.3)
MCV RBC AUTO: 83 FL (ref 81.4–97.8)
MICRO URNS: ABNORMAL
MONOCYTES # BLD AUTO: 0.93 K/UL (ref 0–0.85)
MONOCYTES NFR BLD AUTO: 5.4 % (ref 0–13.4)
NEUTROPHILS # BLD AUTO: 14.28 K/UL (ref 1.82–7.42)
NEUTROPHILS NFR BLD: 82.4 % (ref 44–72)
NITRITE UR QL STRIP.AUTO: NEGATIVE
NRBC # BLD AUTO: 0 K/UL
NRBC BLD-RTO: 0 /100 WBC
PH UR STRIP.AUTO: 6 [PH] (ref 5–8)
PLATELET # BLD AUTO: 383 K/UL (ref 164–446)
PMV BLD AUTO: 9.1 FL (ref 9–12.9)
POTASSIUM SERPL-SCNC: 3.8 MMOL/L (ref 3.6–5.5)
PROT SERPL-MCNC: 8.6 G/DL (ref 6–8.2)
PROT UR QL STRIP: NEGATIVE MG/DL
RBC # BLD AUTO: 5.89 M/UL (ref 4.7–6.1)
RBC # URNS HPF: ABNORMAL /HPF
RBC UR QL AUTO: ABNORMAL
SODIUM SERPL-SCNC: 133 MMOL/L (ref 135–145)
SP GR UR STRIP.AUTO: 1.02
UROBILINOGEN UR STRIP.AUTO-MCNC: 0.2 MG/DL
WBC # BLD AUTO: 17.3 K/UL (ref 4.8–10.8)
WBC #/AREA URNS HPF: ABNORMAL /HPF

## 2020-05-04 PROCEDURE — 99285 EMERGENCY DEPT VISIT HI MDM: CPT

## 2020-05-04 PROCEDURE — 36415 COLL VENOUS BLD VENIPUNCTURE: CPT

## 2020-05-04 PROCEDURE — 85025 COMPLETE CBC W/AUTO DIFF WBC: CPT

## 2020-05-04 PROCEDURE — A9270 NON-COVERED ITEM OR SERVICE: HCPCS | Performed by: EMERGENCY MEDICINE

## 2020-05-04 PROCEDURE — 700102 HCHG RX REV CODE 250 W/ 637 OVERRIDE(OP): Performed by: EMERGENCY MEDICINE

## 2020-05-04 PROCEDURE — 74176 CT ABD & PELVIS W/O CONTRAST: CPT

## 2020-05-04 PROCEDURE — 80053 COMPREHEN METABOLIC PANEL: CPT

## 2020-05-04 PROCEDURE — 83690 ASSAY OF LIPASE: CPT

## 2020-05-04 PROCEDURE — 700111 HCHG RX REV CODE 636 W/ 250 OVERRIDE (IP): Performed by: EMERGENCY MEDICINE

## 2020-05-04 PROCEDURE — 96374 THER/PROPH/DIAG INJ IV PUSH: CPT

## 2020-05-04 PROCEDURE — 81001 URINALYSIS AUTO W/SCOPE: CPT

## 2020-05-04 RX ORDER — IBUPROFEN 600 MG/1
600 TABLET ORAL EVERY 8 HOURS PRN
Qty: 15 TAB | Refills: 0 | Status: SHIPPED | OUTPATIENT
Start: 2020-05-04 | End: 2020-05-09

## 2020-05-04 RX ORDER — HYDROCODONE BITARTRATE AND ACETAMINOPHEN 5; 325 MG/1; MG/1
1 TABLET ORAL EVERY 6 HOURS PRN
Qty: 12 TAB | Refills: 0 | Status: SHIPPED | OUTPATIENT
Start: 2020-05-04 | End: 2020-05-08

## 2020-05-04 RX ORDER — HYDROCODONE BITARTRATE AND ACETAMINOPHEN 5; 325 MG/1; MG/1
1 TABLET ORAL ONCE
Status: COMPLETED | OUTPATIENT
Start: 2020-05-04 | End: 2020-05-04

## 2020-05-04 RX ORDER — TAMSULOSIN HYDROCHLORIDE 0.4 MG/1
0.4 CAPSULE ORAL DAILY
Qty: 5 CAP | Refills: 0 | Status: SHIPPED | OUTPATIENT
Start: 2020-05-04

## 2020-05-04 RX ORDER — KETOROLAC TROMETHAMINE 30 MG/ML
15 INJECTION, SOLUTION INTRAMUSCULAR; INTRAVENOUS ONCE
Status: COMPLETED | OUTPATIENT
Start: 2020-05-04 | End: 2020-05-04

## 2020-05-04 RX ORDER — TAMSULOSIN HYDROCHLORIDE 0.4 MG/1
0.4 CAPSULE ORAL ONCE
Status: COMPLETED | OUTPATIENT
Start: 2020-05-04 | End: 2020-05-04

## 2020-05-04 RX ADMIN — KETOROLAC TROMETHAMINE 15 MG: 30 INJECTION, SOLUTION INTRAMUSCULAR at 19:28

## 2020-05-04 RX ADMIN — TAMSULOSIN HYDROCHLORIDE 0.4 MG: 0.4 CAPSULE ORAL at 21:05

## 2020-05-04 RX ADMIN — HYDROCODONE BITARTRATE AND ACETAMINOPHEN 1 TABLET: 5; 325 TABLET ORAL at 21:05

## 2020-05-04 ASSESSMENT — FIBROSIS 4 INDEX: FIB4 SCORE: 0.57

## 2020-05-05 NOTE — ED NOTES
Medicated per MAR for pain, educated not to drive on narcotics. Discharge instructions and follow up care discussed with patient. Patient given time to ask questions and verbalized understanding. Patient given 3 new prescriptions and strainer with specimen cup and education. Patient AOx4 at discharge and ambulatory to lobby with steady gait to meet pt's mother who will drive him home.

## 2020-05-05 NOTE — ED NOTES
Pt roomed in [facility]. C/o lside flank pain 7/10 as well as pain with urination. Pt reports no history of this type of complaint. VSS. Call light in reach. MD to assess

## 2020-05-05 NOTE — ED PROVIDER NOTES
"ED Provider Note    CHIEF COMPLAINT  Flank pain and dysuria    [facility]  Rebel Rogers is a 25 y.o. male who presents to the emergency department for evaluation of flank pain and dysuria.  The patient states that he started having left-sided flank pain this morning.  It is sharp in nature and does not radiate.  He states that he is having difficulty urinating but it does not hurt to urinate.  He denies any fevers or hematuria.  He has never had pain like this before.  He denies any nausea, vomiting, or diarrhea.  He has not had any runny nose, congestion, coughing, shortness of breath, or chest pain.  He denies any recent travel or contact with anyone with COVID-19 that he is aware of.      REVIEW OF SYSTEMS  See HPI for further details. All other systems are negative.     PAST MEDICAL HISTORY       SOCIAL HISTORY  Social History     Tobacco Use   • Smoking status: Never Smoker   • Smokeless tobacco: Never Used   Substance and Sexual Activity   • Alcohol use: Not on file   • Drug use: Not on file   • Sexual activity: Not on file       SURGICAL HISTORY  patient denies any surgical history    CURRENT MEDICATIONS  Home Medications     Reviewed by Alexandrea Araya R.N. (Registered Nurse) on 05/04/20 at 1726  Med List Status: Complete   Medication Last Dose Status        Patient Milton Taking any Medications                       ALLERGIES  No Known Allergies    PHYSICAL EXAM  VITAL SIGNS: /87   Pulse 80   Temp 36.6 °C (97.9 °F) (Oral)   Resp 18   Ht 1.753 m (5' 9\")   Wt (!) 133.4 kg (294 lb 1.5 oz)   SpO2 96%   BMI 43.43 kg/m²    Constitutional: Alert and in no apparent distress.  Morbidly obese male.  HENT: Normocephalic atraumatic. Bilateral external ears normal. Nose normal. Mucous membranes are moist.  Eyes: Pupils are equal and reactive. Conjunctiva normal. Non-icteric sclera.   Neck: Normal range of motion without tenderness. Supple. No meningeal signs.  Cardiovascular: Regular rate and rhythm. No " murmurs, gallops or rubs.  Thorax & Lungs: Breath sounds are clear to auscultation bilaterally. No wheezing, rhonchi or rales.  Abdomen: Soft, nontender and nondistended. No peritoneal signs noted.  Skin: Warm and dry. No rashes are noted.  Back: No bony tenderness, No CVA tenderness.   Extremities: 2+ peripheral pulses. Cap refill is less than 2 seconds. No edema, cyanosis, or clubbing.  Musculoskeletal: Good range of motion in all major joints. No tenderness to palpation or major deformities noted.   Neurologic: Alert and oriented ×3. The patient moves all 4 extremities and follows commands.  Psychiatric: Affect is normal. Judgment appears to be intact.    DIAGNOSTIC STUDIES / PROCEDURES    LABS  Results for orders placed or performed during the hospital encounter of 05/04/20   CBC WITH DIFFERENTIAL   Result Value Ref Range    WBC 17.3 (H) 4.8 - 10.8 K/uL    RBC 5.89 4.70 - 6.10 M/uL    Hemoglobin 16.8 14.0 - 18.0 g/dL    Hematocrit 48.9 42.0 - 52.0 %    MCV 83.0 81.4 - 97.8 fL    MCH 28.5 27.0 - 33.0 pg    MCHC 34.4 33.7 - 35.3 g/dL    RDW 38.5 35.9 - 50.0 fL    Platelet Count 383 164 - 446 K/uL    MPV 9.1 9.0 - 12.9 fL    Neutrophils-Polys 82.40 (H) 44.00 - 72.00 %    Lymphocytes 11.20 (L) 22.00 - 41.00 %    Monocytes 5.40 0.00 - 13.40 %    Eosinophils 0.20 0.00 - 6.90 %    Basophils 0.30 0.00 - 1.80 %    Immature Granulocytes 0.50 0.00 - 0.90 %    Nucleated RBC 0.00 /100 WBC    Neutrophils (Absolute) 14.28 (H) 1.82 - 7.42 K/uL    Lymphs (Absolute) 1.93 1.00 - 4.80 K/uL    Monos (Absolute) 0.93 (H) 0.00 - 0.85 K/uL    Eos (Absolute) 0.03 0.00 - 0.51 K/uL    Baso (Absolute) 0.05 0.00 - 0.12 K/uL    Immature Granulocytes (abs) 0.08 0.00 - 0.11 K/uL    NRBC (Absolute) 0.00 K/uL   COMP METABOLIC PANEL   Result Value Ref Range    Sodium 133 (L) 135 - 145 mmol/L    Potassium 3.8 3.6 - 5.5 mmol/L    Chloride 99 96 - 112 mmol/L    Co2 20 20 - 33 mmol/L    Anion Gap 14.0 7.0 - 16.0    Glucose 89 65 - 99 mg/dL    Bun 12 8  - 22 mg/dL    Creatinine 0.79 0.50 - 1.40 mg/dL    Calcium 9.8 8.5 - 10.5 mg/dL    AST(SGOT) 123 (H) 12 - 45 U/L    ALT(SGPT) 198 (H) 2 - 50 U/L    Alkaline Phosphatase 104 (H) 30 - 99 U/L    Total Bilirubin 1.7 (H) 0.1 - 1.5 mg/dL    Albumin 4.7 3.2 - 4.9 g/dL    Total Protein 8.6 (H) 6.0 - 8.2 g/dL    Globulin 3.9 (H) 1.9 - 3.5 g/dL    A-G Ratio 1.2 g/dL   LIPASE   Result Value Ref Range    Lipase 28 11 - 82 U/L   URINALYSIS CULTURE, IF INDICATED   Result Value Ref Range    Color Yellow     Character Clear     Specific Gravity 1.022 <1.035    Ph 6.0 5.0 - 8.0    Glucose Negative Negative mg/dL    Ketones Negative Negative mg/dL    Protein Negative Negative mg/dL    Bilirubin Negative Negative    Urobilinogen, Urine 0.2 Negative    Nitrite Negative Negative    Leukocyte Esterase Negative Negative    Occult Blood Small (A) Negative    Micro Urine Req Microscopic    URINE MICROSCOPIC (W/UA)   Result Value Ref Range    WBC 0-2 (A) /hpf    RBC 5-10 (A) /hpf    Bacteria Negative None /hpf    Epithelial Cells Negative /hpf    Hyaline Cast 0-2 /lpf   ESTIMATED GFR   Result Value Ref Range    GFR If African American >60 >60 mL/min/1.73 m 2    GFR If Non African American >60 >60 mL/min/1.73 m 2     RADIOLOGY  CT-RENAL COLIC EVALUATION(A/P W/O)   Final Result         1. Obstructing 3 mm calculus in the left UVJ with mild left hydronephrosis.      2. Hepatic steatosis.      3. Small fat-containing left inguinal hernia.          COURSE & MEDICAL DECISION MAKING  Pertinent Labs & Imaging studies reviewed. (See chart for details)    This is a 25-year-old male presenting to the ED for evaluation of flank pain and dysuria.  On initial evaluation, the patient appeared well and in no acute distress.  His vitals were normal.  He was noted to be morbidly obese, but his physical exam was otherwise reassuring.  An IV was established and labs were sent.  He did have a leukocytosis of 17 but did not have any fevers or history of fevers  and I am less concerned for 6 bacterial illness.  Urinalysis was obtained and revealed red blood cells with 0-2 white cells but no bacteria.  Again I am less concerned for pyelonephritis or UTI at this time.  LFTs were elevated but upon review of his previous labs he does have a history of this.  CT was obtained and revealed an obstructing 3 mm calculus in the left UVJ with mild hydronephrosis.  I suspect this is the etiology of his pain.  I have low suspicion for infected stone at this time given the lack of bacteria in his urine or fevers.  I suspect the leukocytosis on CBC was likely reactive.  He was treated with Toradol, Norco, and Flomax.  Upon reassessment, he is resting comfortably and I do believe he is stable for discharge.  I did give him urology follow-up.  I also encouraged him to follow-up with his primary care physician for the hepatic steatosis and elevated LFTs.    The patient presents with abdominal pain without signs of peritonitis or other life-threatening or serious etiology. The patient appears stable for discharge and has been instructed to return immediately if the symptoms worsen in any way, or in 8-12hr if not improved for re-evaluation. The patient has been instructed to return if the symptoms worsen or change in any way.    I verified that the patient was wearing a mask and I was wearing appropriate PPE every time I entered the room. The patient's mask was on the patient at all times during my encounter except for a brief view of the oropharynx.    FINAL IMPRESSION  1. Kidney stone    2. Elevated LFTs    3. Hepatic steatosis        PRESCRIPTIONS  New Prescriptions    No medications on file       FOLLOW UP  Palmer Sosa M.D.  [address]  [facility] [address]  [phone]    Call in 1 day  To schedule a follow up appointment    [facility], Emergency Dept  [address] [facility] [address] [phone]  Go to   As needed    -DISCHARGE-    Electronically  signed by: Zakia Do D.O., 5/4/2020 6:17 PM

## 2020-05-05 NOTE — ED NOTES
Bedside report received from day RN. PIV placed and blood sent to lab. Medicated per MAR for 7/10 L flank pain. Denies N/V. VSS.

## 2020-05-05 NOTE — ED TRIAGE NOTES
Chief Complaint   Patient presents with   • Flank Pain     left side   • Painful Urination     Pt ambulated to triage symptoms of left flank pain with dysuria started this morning.  Provided with specimen cup and clean catch instruction.